# Patient Record
Sex: FEMALE | Race: BLACK OR AFRICAN AMERICAN | NOT HISPANIC OR LATINO | Employment: OTHER | URBAN - METROPOLITAN AREA
[De-identification: names, ages, dates, MRNs, and addresses within clinical notes are randomized per-mention and may not be internally consistent; named-entity substitution may affect disease eponyms.]

---

## 2021-03-15 ENCOUNTER — OFFICE VISIT - HEALTHEAST (OUTPATIENT)
Dept: FAMILY MEDICINE | Facility: CLINIC | Age: 31
End: 2021-03-15

## 2021-03-15 DIAGNOSIS — M22.2X2 PATELLOFEMORAL ARTHRALGIA OF BOTH KNEES: ICD-10-CM

## 2021-03-15 DIAGNOSIS — N92.6 MISSED PERIOD: ICD-10-CM

## 2021-03-15 DIAGNOSIS — D50.0 IRON DEFICIENCY ANEMIA DUE TO CHRONIC BLOOD LOSS: ICD-10-CM

## 2021-03-15 DIAGNOSIS — M22.2X1 PATELLOFEMORAL ARTHRALGIA OF BOTH KNEES: ICD-10-CM

## 2021-03-15 DIAGNOSIS — E66.3 OVERWEIGHT (BMI 25.0-29.9): ICD-10-CM

## 2021-03-15 DIAGNOSIS — Z00.00 ENCOUNTER FOR GENERAL ADULT MEDICAL EXAMINATION WITHOUT ABNORMAL FINDINGS: ICD-10-CM

## 2021-03-15 DIAGNOSIS — Z32.01 PREGNANCY TEST POSITIVE: ICD-10-CM

## 2021-03-15 LAB
CHOLEST SERPL-MCNC: 125 MG/DL
ERYTHROCYTE [DISTWIDTH] IN BLOOD BY AUTOMATED COUNT: 17.1 % (ref 11–14.5)
FASTING STATUS PATIENT QL REPORTED: NO
HBA1C MFR BLD: 5 %
HCG UR QL: POSITIVE
HCT VFR BLD AUTO: 33.6 % (ref 35–47)
HDLC SERPL-MCNC: 44 MG/DL
HGB BLD-MCNC: 10.1 G/DL (ref 12–16)
LDLC SERPL CALC-MCNC: 70 MG/DL
MCH RBC QN AUTO: 20.7 PG (ref 27–34)
MCHC RBC AUTO-ENTMCNC: 30.1 G/DL (ref 32–36)
MCV RBC AUTO: 69 FL (ref 80–100)
PLATELET # BLD AUTO: 424 THOU/UL (ref 140–440)
PMV BLD AUTO: 10.5 FL (ref 7–10)
RBC # BLD AUTO: 4.87 MILL/UL (ref 3.8–5.4)
TRIGL SERPL-MCNC: 55 MG/DL
TSH SERPL DL<=0.005 MIU/L-ACNC: 3.88 UIU/ML (ref 0.3–5)
WBC: 8.4 THOU/UL (ref 4–11)

## 2021-03-15 RX ORDER — PRENATAL VIT/IRON FUM/FOLIC AC 27MG-0.8MG
1 TABLET ORAL DAILY
Qty: 90 TABLET | Refills: 3 | Status: SHIPPED | OUTPATIENT
Start: 2021-03-15 | End: 2022-05-24

## 2021-03-15 ASSESSMENT — MIFFLIN-ST. JEOR: SCORE: 1407.72

## 2021-03-16 ENCOUNTER — COMMUNICATION - HEALTHEAST (OUTPATIENT)
Dept: FAMILY MEDICINE | Facility: CLINIC | Age: 31
End: 2021-03-16

## 2021-03-16 ENCOUNTER — AMBULATORY - HEALTHEAST (OUTPATIENT)
Dept: FAMILY MEDICINE | Facility: CLINIC | Age: 31
End: 2021-03-16

## 2021-03-16 DIAGNOSIS — D50.9 IRON DEFICIENCY ANEMIA, UNSPECIFIED IRON DEFICIENCY ANEMIA TYPE: ICD-10-CM

## 2021-03-16 LAB
FERRITIN SERPL-MCNC: 3 NG/ML (ref 10–130)
HCV AB SERPL QL IA: NEGATIVE

## 2021-03-16 RX ORDER — FERROUS SULFATE 325(65) MG
1 TABLET ORAL
Qty: 90 TABLET | Refills: 0 | Status: SHIPPED | OUTPATIENT
Start: 2021-03-16 | End: 2022-05-24

## 2021-03-17 ENCOUNTER — COMMUNICATION - HEALTHEAST (OUTPATIENT)
Dept: FAMILY MEDICINE | Facility: CLINIC | Age: 31
End: 2021-03-17

## 2021-03-19 ENCOUNTER — OFFICE VISIT - HEALTHEAST (OUTPATIENT)
Dept: PHYSICAL THERAPY | Facility: REHABILITATION | Age: 31
End: 2021-03-19

## 2021-03-19 DIAGNOSIS — M76.31 ILIOTIBIAL BAND SYNDROME OF RIGHT SIDE: ICD-10-CM

## 2021-03-19 DIAGNOSIS — M76.32 ILIOTIBIAL BAND SYNDROME OF LEFT SIDE: ICD-10-CM

## 2021-03-19 DIAGNOSIS — M22.2X1 PATELLOFEMORAL PAIN SYNDROME OF BOTH KNEES: ICD-10-CM

## 2021-03-19 DIAGNOSIS — M62.81 GENERALIZED MUSCLE WEAKNESS: ICD-10-CM

## 2021-03-19 DIAGNOSIS — M22.2X2 PATELLOFEMORAL PAIN SYNDROME OF BOTH KNEES: ICD-10-CM

## 2021-03-23 ENCOUNTER — OFFICE VISIT - HEALTHEAST (OUTPATIENT)
Dept: PHYSICAL THERAPY | Facility: REHABILITATION | Age: 31
End: 2021-03-23

## 2021-03-23 DIAGNOSIS — M22.2X2 PATELLOFEMORAL PAIN SYNDROME OF BOTH KNEES: ICD-10-CM

## 2021-03-23 DIAGNOSIS — M22.2X1 PATELLOFEMORAL PAIN SYNDROME OF BOTH KNEES: ICD-10-CM

## 2021-03-23 DIAGNOSIS — M76.31 ILIOTIBIAL BAND SYNDROME OF RIGHT SIDE: ICD-10-CM

## 2021-03-23 DIAGNOSIS — M62.81 GENERALIZED MUSCLE WEAKNESS: ICD-10-CM

## 2021-03-23 DIAGNOSIS — M76.32 ILIOTIBIAL BAND SYNDROME OF LEFT SIDE: ICD-10-CM

## 2021-04-13 ENCOUNTER — OFFICE VISIT - HEALTHEAST (OUTPATIENT)
Dept: PHYSICAL THERAPY | Facility: REHABILITATION | Age: 31
End: 2021-04-13

## 2021-04-13 DIAGNOSIS — M76.32 ILIOTIBIAL BAND SYNDROME OF LEFT SIDE: ICD-10-CM

## 2021-04-13 DIAGNOSIS — M62.81 GENERALIZED MUSCLE WEAKNESS: ICD-10-CM

## 2021-04-13 DIAGNOSIS — M22.2X1 PATELLOFEMORAL PAIN SYNDROME OF BOTH KNEES: ICD-10-CM

## 2021-04-13 DIAGNOSIS — M22.2X2 PATELLOFEMORAL PAIN SYNDROME OF BOTH KNEES: ICD-10-CM

## 2021-04-13 DIAGNOSIS — M76.31 ILIOTIBIAL BAND SYNDROME OF RIGHT SIDE: ICD-10-CM

## 2021-05-07 LAB
ABO (EXTERNAL): NORMAL
HEPATITIS B SURFACE ANTIGEN (EXTERNAL): NONREACTIVE
RH (EXTERNAL): POSITIVE
RUBELLA ANTIBODY IGG (EXTERNAL): NORMAL
TREPONEMA PALLIDUM ANTIBODY (EXTERNAL): NONREACTIVE

## 2021-06-05 VITALS
WEIGHT: 161.5 LBS | BODY MASS INDEX: 28.62 KG/M2 | HEIGHT: 63 IN | HEART RATE: 92 BPM | DIASTOLIC BLOOD PRESSURE: 60 MMHG | OXYGEN SATURATION: 100 % | SYSTOLIC BLOOD PRESSURE: 120 MMHG
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
## 2021-06-16 PROBLEM — Z32.01 PREGNANCY TEST POSITIVE: Status: ACTIVE | Noted: 2021-03-15

## 2021-06-16 PROBLEM — E66.3 OVERWEIGHT (BMI 25.0-29.9): Status: ACTIVE | Noted: 2021-03-16

## 2021-06-16 PROBLEM — D50.0 IRON DEFICIENCY ANEMIA DUE TO CHRONIC BLOOD LOSS: Status: ACTIVE | Noted: 2021-03-16

## 2021-06-16 NOTE — TELEPHONE ENCOUNTER
Using  left message for pt to call back. Please relay message from Dr. Ortega:     Overall, her blood work looks good but she is anemic and has low iron stores.  She should take daily prenatal vitamins and iron supplementation in addition to that.  I have sent over some iron supplementation to the pharmacy.  This is very important given that she is pregnant and her anemia will most likely get worse without supplementation during the pregnancy.     Please remind patient to call OB/GYN and set up the appointment for her initial prenatal visit.     Letter sent via mail.

## 2021-06-16 NOTE — PROGRESS NOTES
"[facility] Discharge Note    Patient Name: Tammie Odonnell  Date: 4/13/2021  Visit #: 3  PTA visit #:  1  Referral Diagnosis: Patellofemoral pain syndrome of both knees  Referring provider: Alyse Ortega*  Visit Diagnosis:     ICD-10-CM    1. Patellofemoral pain syndrome of both knees  M22.2X1     M22.2X2    2. Iliotibial band syndrome of left side  M76.32    3. Iliotibial band syndrome of right side  M76.31    4. Generalized muscle weakness  M62.81          Assessment:   Pt has no showed her previous 2 therapy sessions. She has not been icing or doing her HEP but reports doing better overall. Patient has pain-free knee AROM now. She is inquiring about imaging today. Due to her excellent ROM without pain, therapist does not see it medically necessary to request an order for imaging. Due to patient's consistent non-compliance with therapy, she is appropriate for discharge today. Encouraged patient to ice and perform her exercises as instructed/typed out for her, as these should help to continue to decrease her pain. Pt will need a new order to resume in the future.    HEP/POC compliance is  poor.  Patient demonstrates understanding/independence with home program.    Goal Status:  Pt. will demonstrate/verbalize independence in self-management of condition in : 12 weeks;Met  Pt. will be independent with home exercise program in : 6 weeks;Met    Pt will: have pain-free B knee AROM within 10 weeks in order to improve her QOL. GOAL MET  Pt will: no longer have pain getting out of a chair in order to return to PLOF. GOAL NOT MET      Plan / Patient Education:     Discharge from therapy    Subjective:     Pain Rating: \"i'm generally doing well\"    I'm doing good but I still feel pain. Pain is different and now on knee cap. In general, she's doing good. Wondering if she can get x-rays today.     Macedonian  used via phone today.    Objective:   B knee ROM: 0-135 degrees without pain.  Pt only doing " "3 repetitions of her HEP at a time    Exercises:  Exercise #1: SLR x10 B-pt only doing 3 reps on each side  Comment #1: clamshell x10 B-pt only doing 3 reps on each side  Exercise #2: hip add x10 with 5\" holds- pt only doing 4 reps  Exercise #3: supine hip flexor stretch  Comment #3: B  30\" x 2-pt not doing  Exercise #4: bridge-pt not doing  Comment #4: x5  Exercise #5: quadraped leg raises- pt not doing  Comment #5: x5 B    Treatment Today     TREATMENT MINUTES COMMENTS   Evaluation     Self-care/ Home management     Manual therapy     Neuromuscular Re-education     Therapeutic Activity     Therapeutic Exercises 23 Discussed progress. Answered pt questions. Reviewed HEP. Discussed goals and discharge plans.   Gait training     Modality__________________                Total 23    Blank areas are intentional and mean the treatment did not include these items.       Elkin Alas PT, DPT  4/13/2021    "

## 2021-06-16 NOTE — PROGRESS NOTES
[facility] Daily Progress     Patient Name: Tammie Odonnell  Date: 3/23/2021  Visit #: 2  PTA visit #:  1  Referral Diagnosis: Patellofemoral pain syndrome of both knees  Referring provider: Alyse Ortega*  Visit Diagnosis:     ICD-10-CM    1. Patellofemoral pain syndrome of both knees  M22.2X1     M22.2X2    2. Iliotibial band syndrome of left side  M76.32    3. Iliotibial band syndrome of right side  M76.31    4. Generalized muscle weakness  M62.81          Assessment:   Pt returns today for her first follow up appointment.  Pt with tenderness B lateral, superior and posterior knees.   Pt has not been doing her exercises thinking they would make her pain worse. Pt did have some pain with the SLRs.  Pt asking to see a doctor today. She was thinking she should see an orthopedic doctor. Pt was instructed to contact her PCP to see if see will need a referral.   Patient is benefitting from skilled physical therapy and is making steady progress toward functional goals.  Patient is appropriate to continue with skilled physical therapy intervention, as indicated by initial plan of care.    Goal Status:  Pt. will demonstrate/verbalize independence in self-management of condition in : 12 weeks  Pt. will be independent with home exercise program in : 6 weeks    Pt will: have pain-free B knee AROM within 10 weeks in order to improve her QOL.  Pt will: no longer have pain getting out of a chair in order to return to PLOF.      Plan / Patient Education:     Continue with initial plan of care.  Progress with home program as tolerated.   Pt instructed to perform her exercises every day for best results. Pt advised to try ice on her knees. Instructed her to ice for 10-15 minutes.    Subjective:   Pt reports she is still having knee pain.    Pt is not using ice or heat at home on her knees.  Pt reports she has not done the exercises. Pt states she did not do the exercises because she thought they would make the  "pain worse.  Pain Rating: 10        Objective:   B knee ROM: 0-135 degrees with ERP B    Exercises:  Exercise #1: SLR x10 B  Comment #1: clamshell x10 B  Exercise #2: hip add x10 with 5\" holds  Exercise #3: supine hip flexor stretch  Comment #3: B  30\" x 2  Exercise #4: bridge  Comment #4: x5  Exercise #5: quadraped leg raises  Comment #5: x5 B    Treatment Today     TREATMENT MINUTES COMMENTS   Evaluation     Self-care/ Home management     Manual therapy     Neuromuscular Re-education     Therapeutic Activity     Therapeutic Exercises 30 See flow sheet  Added to HEP:  -bridge  -quadraped leg raises  -supine hip flexor stretch   Gait training     Modality__________________                Total 30    Blank areas are intentional and mean the treatment did not include these items.       Shae Ramos,CLT  3/23/2021    "

## 2021-06-16 NOTE — TELEPHONE ENCOUNTER
Left message to call back for: Patient  Information to relay to patient:  MIHAI with [place] .  When Pt calls back please relay providers message below. Thank you      Please call patient and let her know:     Overall, her blood work looks good but she is anemic and has low iron stores.  She should take daily prenatal vitamins and iron supplementation in addition to that.  I have sent over some iron supplementation to the pharmacy.  This is very important given that she is pregnant and her anemia will most likely get worse without supplementation during the pregnancy.     Please remind patient to call OB/GYN and set up the appointment for her initial prenatal visit.

## 2021-06-16 NOTE — TELEPHONE ENCOUNTER
Patient was informed of the providers message and is in understanding.  Pt was also given a couple numbers for ob/gyn.

## 2021-06-16 NOTE — TELEPHONE ENCOUNTER
----- Message from Alyse Ortega DO sent at 3/16/2021 12:46 PM CDT -----  Please call patient and let her know:    Overall, her blood work looks good but she is anemic and has low iron stores.  She should take daily prenatal vitamins and iron supplementation in addition to that.  I have sent over some iron supplementation to the pharmacy.  This is very important given that she is pregnant and her anemia will most likely get worse without supplementation during the pregnancy.    Please remind patient to call OB/GYN and set up the appointment for her initial prenatal visit.

## 2021-06-18 NOTE — PATIENT INSTRUCTIONS - HE
Patient Instructions by Alyse Ortega DO at 3/15/2021  2:40 PM     Author: Alyse Ortega DO Service: -- Author Type: Physician    Filed: 3/15/2021  3:41 PM Encounter Date: 3/15/2021 Status: Addendum    : Alyse Ortega DO (Physician)    Related Notes: Original Note by Alyse Ortega DO (Physician) filed at 3/15/2021  3:40 PM       Hi your pregnancy test came back positive, congratulations!    Your approximately 4 weeks pregnant.  You need to follow-up with OB/GYN at around 8 weeks.  Please see [facility] on call at [phone] to establish care.    Please make sure that you are taking prenatal vitamins daily.    Due to your positive pregnancy test, we will defer all your vaccines for today.    For your knee pain, I have referred you to physical therapy.  Someone should be giving you a call to help schedule that appointment.      Patient Education   Understanding [employer] MyPlate  The USDA ([place] Department of Agriculture) has guidelines to help you make healthy food choices. These are called MyPlate. MyPlate shows the food groups that make up healthy meals using the image of a place setting. Before you eat, think about the healthiest choices for what to put onto your plate or into your cup or bowl. To learn more about building a healthy plate, visit www.choosemyplate.gov.       The Food Groups    Fruits: Any fruit or 100% fruit juice counts as part of the Fruit Group. Fruits may be fresh, canned, frozen, or dried, and may be whole, cut-up, or pureed. Make half your plate fruits and vegetables.    Vegetables: Any vegetable or 100% vegetable juice counts as a member of the Vegetable Group. Vegetables may be fresh, frozen, canned, or dried. They can be served raw or cooked and may be whole, cut-up, or mashed. Make half your plate fruits and vegetables.     Grains: All foods made from grains are part of the Grains Group. These include wheat, rice, oats, cornmeal, and  barley such as bread, pasta, oatmeal, cereal, tortillas, and grits. Grains should be no more than a quarter of your plate. At least half of your grains should be whole grains.    Protein: This group includes meat, poultry, seafood, beans and peas, eggs, processed soy products (like tofu), nuts (including nut butters), and seeds. Make protein choices no more than a quarter of your plate. Meat and poultry choices should be lean or low fat.    Dairy: All fluid milk products and foods made from milk that contain calcium, like yogurt and cheese are part of the Dairy Group. (Foods that have little calcium, such as cream, butter, and cream cheese, are not part of the group.) Most dairy choices should be low-fat or fat-free.    Oils: These are fats that are liquid at room temperature. They include canola, corn, olive, soybean, and sunflower oil. Foods that are mainly oil include mayonnaise, certain salad dressings, and soft margarines. You should have only 5 to 7 teaspoons of oils a day. You probably already get this much from the food you eat.  Use [employer] to Help Build Your Meals  The [employer] can help you plan and track your meals and activity. You can look up individual foods to see or compare their nutritional value. You can get guidelines for what and how much you should eat. You can compare your food choices. And you can assess personal physical activities and see ways you can improve. Go to www.[employer].gov/supertracker/.    0721-5503 [employer]. [address] [place] [address], [place] [address]. All rights reserved. This information is not intended as a substitute for professional medical care. Always follow your healthcare professional's instructions.

## 2021-06-18 NOTE — PATIENT INSTRUCTIONS - HE
Patient Instructions by Shae Purvis PTA at 3/23/2021  1:30 PM     Author: Shae Purvis PTA Service: -- Author Type: Physical Therapist Assistant    Filed: 3/23/2021  1:55 PM Encounter Date: 3/23/2021 Status: Signed    : Shae Purvis PTA (Physical Therapist Assistant)        QUADRUPED ARM LIFT    Start on hands and knees while keeping your spine flat and neutral.  Tighten abdominal muscles.  Raise arm and hold 3-5 seconds.  Return to original position and alternate arms.    QUADRUPED LEG LIFT    Start on hands and knees while keeping your spine flat and neutral.  Tighten abdominal muscles.  Raise leg back and hold 3-5 seconds.  Return to original position and alternate legs.    QUADRUPED ARM/LEG LIFT    Start on hands and knees while keeping your spine flat and neutral.  Tighten abdominal muscles.  Raise leg back and opposite arm and hold 3-5 seconds.  Return to original position and alternate.

## 2021-06-18 NOTE — PATIENT INSTRUCTIONS - HE
Patient Instructions by Elkin Alas PT at 3/19/2021  8:00 AM     Author: Elkin Alas PT Service: -- Author Type: Physical Therapist    Filed: 3/19/2021  8:54 AM Encounter Date: 3/19/2021 Status: Signed    : Elkin Alas PT (Physical Therapist)       It is recommended that you ice 2-3x/day for 20 minutes at a time. Remember to not apply ice directly on skin.    Remember to wear good footwear!     STRAIGHT LEG RAISE - SLR    While lying or sitting, raise up your leg with a straight knee.  Keep the opposite knee bent with the foot planted to the ground. Do 10-20 reps on each side. Perform 1-2x/day      CLAM SHELLS    While lying on your side with your knees bent, draw up the top knee while keeping contact of your feet together.    Do not let your pelvis roll back during the lifting movement. Hold for 5 seconds on each side. Do 10-15 repetitions at a time. Perform 1-2x/day      BALL SQUEEZE - SEATED    While sitting, place a ball between your knees. Squeeze the ball with your knees and hold for 5 seconds. Do 10-20 repetitions at a time. Perform 1-2x/day

## 2021-06-30 NOTE — PROGRESS NOTES
Progress Notes by Elkin Alas PT at 3/19/2021  8:00 AM     Author: Elkin Alas PT Service: -- Author Type: Physical Therapist    Filed: 3/19/2021 12:19 PM Encounter Date: 3/19/2021 Status: Attested    : Elkin Alas PT (Physical Therapist) Cosigner: Alyse Ortega DO at 3/19/2021  5:23 PM    Attestation signed by Alyse Ortega DO at 3/19/2021  5:23 PM    Note reviewed, agree with plan.                     [facility] Certification Request    March 19, 2021      Patient: Tammie Odonnell  MR Number: [id number]  YOB: 1990  Date of Visit: 3/19/2021      Dear Dr. Ortega,    Thank you for this referral.   We are seeing Tammie Odonnell for Physical Therapy of bilateral knee pain.    Medicare and/or Medicaid requires physician review and approval of the treatment plan. Please review the plan of care and verify that you agree with the therapy plan of care by co-signing this note.      Plan of Care  Authorization / Certification Start Date: 03/19/21  Authorization / Certification End Date: 06/18/21  Authorization / Certification Number of Visits: 12  Communication with: Referral Source  Patient Related Instruction: Nature of Condition;Precautions;Next steps;Treatment plan and rationale;Expected outcome;Self Care instruction;Basis of treatment;Body mechanics;Posture  Times per Week: 1  Number of Weeks: 12  Number of Visits: 12  Discharge Planning: when PT goals are met  Precautions / Restrictions : PT IS 2 MONTHS PREGNANT  Therapeutic Exercise: ROM;Stretching;Strengthening  Neuromuscular Reeducation: core;kinesio tape;balance/proprioception  Manual Therapy: soft tissue mobilization;myofascial release;joint mobilization;muscle energy  Equipment: theraband      Goals:  Pt. will demonstrate/verbalize independence in self-management of condition in : 12 weeks  Pt. will be independent with home exercise program in : 6 weeks    Pt will: have pain-free B knee AROM within 10  weeks in order to improve her QOL.  Pt will: no longer have pain getting out of a chair in order to return to OF.        If you have any questions or concerns, please don't hesitate to call.    Sincerely,      Elkin Alas, PT, DPT        Physician recommendation:     ___ Follow therapist's recommendation        ___ Modify therapy      *Physician co-signature indicates they certify the need for these services furnished within this plan and while under their care.      [facility]  Knee Initial Evaluation    Patient Name: Tammie Odonnell  Date of evaluation: 3/19/2021  Referral Diagnosis: patellofemoral arthralgia of both knees  Referring provider: Alyse Ortega*  Visit Diagnosis:     ICD-10-CM    1. Patellofemoral pain syndrome of both knees  M22.2X1     M22.2X2    2. Iliotibial band syndrome of left side  M76.32    3. Iliotibial band syndrome of right side  M76.31    4. Generalized muscle weakness  M62.81        History reviewed. No pertinent past medical history.    Assessment:        Tammie Odonnell is a 31 y.o. female who presents to therapy today with chief complaints of chronic B knee pain.  Symptoms began 1 year ago without known injury.  Difficulty with standing up after sitting due to pain.  Pain symptoms are not improving.  Patient demonstrates signs and sx consistent with bilateral patellofemoral pain and bilateral IT band syndrome. PT POC and goals have been discussed with patient and She  is agreeable to these. Patient appears motivated for physical therapy and is appropriate for skilled therapy services.    The POC is dynamic and will be modified on an ongoing basis.  Barriers to achieving goals as noted in the assessment section may affect outcome.  Prognosis to achieve goals is  good   Pt. is appropriate for skilled PT intervention as outlined in the Plan of Care (POC).  Pt. is a good candidate for skilled PT services to improve pain levels and function.    Goals:  Pt. will  "demonstrate/verbalize independence in self-management of condition in : 12 weeks  Pt. will be independent with home exercise program in : 6 weeks    Pt will: have pain-free B knee AROM within 10 weeks in order to improve her QOL.  Pt will: no longer have pain getting out of a chair in order to return to PLOF.      Goals and plan of care were set in collaboration with the patient.    Patient's expectations/goals are realistic.    Barriers to Learning or Achieving Goals:  Chronicity of the problem.  Language barrier     Plan / Patient Instructions:      Plan of Care:   Authorization / Certification Start Date: 03/19/21  Authorization / Certification End Date: 06/18/21  Authorization / Certification Number of Visits: 12  Communication with: Referral Source  Patient Related Instruction: Nature of Condition;Precautions;Next steps;Treatment plan and rationale;Expected outcome;Self Care instruction;Basis of treatment;Body mechanics;Posture  Times per Week: 1  Number of Weeks: 12  Number of Visits: 12  Discharge Planning: when PT goals are met  Precautions / Restrictions : PT IS 2 MONTHS PREGNANT  Therapeutic Exercise: ROM;Stretching;Strengthening  Neuromuscular Reeducation: core;kinesio tape;balance/proprioception  Manual Therapy: soft tissue mobilization;myofascial release;joint mobilization;muscle energy  Equipment: [email or web address]      POC and pathology of condition were reviewed with patient.  Pt. is in agreement with the Plan of Care  A Home Exercise Program (HEP) was initiated today.  Pt. was instructed in exercises by PT and patient was given a handout with detailed instructions.    Exercises:  Exercise #1: SLR x10 B  Comment #1: clamshell x10 B  Exercise #2: hip add x10 with 5\" holds      Treatment techniques, plan of care, and goals were discussed with the patient. The patient agrees to the plan as outlined. The plan of care is dynamic and will be modified on an ongoing basis.    Plan for next visit: NO PRONE EXS- Pt is " 2 months pregnant. Review HEP. hip flexor stretches, bridge, bird/dog (glut/core strengthening)     Subjective:        Social information:   Occupation: unemployed   2 kids at home    present during today's session     History of Present Illness:    Tammie is a 31 y.o. female who presents to therapy today with complaints of bilateral knee pain. Date of onset/duration of symptoms is 1 year. Onset was sudden without known injury. Symptoms are not improving. Hasn't used ice or heat.     is interpreting due to difficulty getting attention of  via phone    Pain Ratin  Pain description: pain on posterior/lateral knee.     Functional limitations are described as occurring with: standing up, sitting         Objective:      Note: Items left blank indicates the item was not performed or not indicated at the time of the evaluation.    Knee Examination  1. Patellofemoral pain syndrome of both knees     2. Iliotibial band syndrome of left side     3. Iliotibial band syndrome of right side     4. Generalized muscle weakness       Precautions/Restrictions:  pt is 2 months pregnant  Involved Side: Bilateral  Assistive Device: None  Gait Observation: normal  Lumbar Clearing: Does not provoke symptoms  Hip Clearing: Does not provoke symptoms    Knee ROM         AROM in degrees  Right   Left       Knee Flexion  (130 )   128- pain at end- range   125, pain at end-range       Knee Extension  (0 )   0   -     PROM in degrees  Right   Left       Knee Flexion  (130 )             Knee Extension  (0 )           LE Strength                Strength (MMT/5)  Right   Left       Hip Flexion   4   4       Hip Abduction   4-   4-       Hip Adduction   4-   4-       Hip Extension             Hip Internal Rotation   4-   4-       Hip External Rotation 4-   4-       Knee Extension   4-   4-       Knee Flexion   4   4       Ankle Dorsiflexion             Ankle Plantarflexion             Palpation:  Tender B patellar tendons, B  distal IT bands  Some mild edema in B ant knees    Knee Special Tests (+/-):      Knee OA Cluster   Right   Left   Ligament Tests   Right   Left    1. > 49 y/o           Lachman   -   -    2. Stiffness > 30 min.           Anterior Drawer          3. Crepitus           Posterior Drawer   -   -    4. Bony tenderness           Posterior Sag -   -    5. Bone enlargement           Valgus Stress   -   -    6. No warmth to the touch           Varus Stress   -   -     Meniscal Tests   Right   Left    Other   Right    Left       Darius's           Ely's             Joint line tenderness           Ja             Thessaly           roy's   +   +       Apley's           compression   +   +         Treatment Today     TREATMENT MINUTES COMMENTS   Evaluation 20 Knee eval. Discussed PT POC and pathology of condition.   Self-care/ Home management 15 Answered patient questions regarding management of condition. Recommend patient ice daily and wear good footwear.   Manual therapy     Neuromuscular Re-education     Therapeutic Activity     Therapeutic Exercises 10 Began HEP-see flowsheet.    Gait training     Modality__________________                Total 45    Blank areas are intentional and mean the treatment did not include these items.     PT Evaluation Code: (Please list factors)  Patient History/Comorbidities: see PMH  Examination: B knee pain  Clinical Presentation: stable  Clinical Decision Making: low    Patient History/  Comorbidities Examination  (body structures and functions, activity limitations, and/or participation restrictions) Clinical Presentation Clinical Decision Making (Complexity)   No documented Comorbidities or personal factors 1-2 Elements Stable and/or uncomplicated Low   1-2 documented comorbidities or personal factor 3 Elements Evolving clinical presentation with changing characteristics Moderate   3-4 documented comorbidities or personal factors 4 or more Unstable and unpredictable High                 Elkin Alas, PT, DPT  3/19/2021  8:03 AM

## 2021-07-04 NOTE — ADDENDUM NOTE
Addendum Note by Romulo Ortega DO at 3/15/2021  2:40 PM     Author: Romulo Ortega DO Service: -- Author Type: Physician    Filed: 3/16/2021  6:47 AM Encounter Date: 3/15/2021 Status: Signed    : Romulo Ortega DO (Physician)    Addended by: ROMULO ORTEGA on: 3/16/2021 06:47 AM        Modules accepted: Orders

## 2021-07-24 ENCOUNTER — HOSPITAL ENCOUNTER (OUTPATIENT)
Facility: HOSPITAL | Age: 31
End: 2021-07-24
Admitting: OBSTETRICS & GYNECOLOGY

## 2021-07-24 ENCOUNTER — HOSPITAL ENCOUNTER (EMERGENCY)
Facility: HOSPITAL | Age: 31
Discharge: ANOTHER HOSPITAL | End: 2021-07-24

## 2021-07-24 ENCOUNTER — HOSPITAL ENCOUNTER (OUTPATIENT)
Facility: HOSPITAL | Age: 31
Discharge: HOME | End: 2021-07-24
Attending: OBSTETRICS & GYNECOLOGY | Admitting: OBSTETRICS & GYNECOLOGY
Payer: COMMERCIAL

## 2021-07-24 VITALS
WEIGHT: 130 LBS | OXYGEN SATURATION: 99 % | BODY MASS INDEX: 23.21 KG/M2 | RESPIRATION RATE: 18 BRPM | DIASTOLIC BLOOD PRESSURE: 56 MMHG | HEART RATE: 84 BPM | TEMPERATURE: 97.9 F | SYSTOLIC BLOOD PRESSURE: 97 MMHG

## 2021-07-24 VITALS — RESPIRATION RATE: 16 BRPM | TEMPERATURE: 98.7 F | SYSTOLIC BLOOD PRESSURE: 122 MMHG | DIASTOLIC BLOOD PRESSURE: 70 MMHG

## 2021-07-24 LAB
ALBUMIN SERPL-MCNC: 2.7 G/DL (ref 3.5–5)
ALBUMIN UR-MCNC: NEGATIVE MG/DL
ALP SERPL-CCNC: 69 U/L (ref 45–120)
ALT SERPL W P-5'-P-CCNC: <9 U/L (ref 0–45)
ANION GAP SERPL CALCULATED.3IONS-SCNC: 9 MMOL/L (ref 5–18)
APPEARANCE UR: CLEAR
AST SERPL W P-5'-P-CCNC: 12 U/L (ref 0–40)
BASOPHILS # BLD AUTO: 0 10E3/UL (ref 0–0.2)
BASOPHILS NFR BLD AUTO: 0 %
BILIRUB SERPL-MCNC: 0.3 MG/DL (ref 0–1)
BILIRUB UR QL STRIP: NEGATIVE
BUN SERPL-MCNC: 6 MG/DL (ref 8–22)
CALCIUM SERPL-MCNC: 8.3 MG/DL (ref 8.5–10.5)
CHLORIDE BLD-SCNC: 109 MMOL/L (ref 98–107)
CO2 SERPL-SCNC: 20 MMOL/L (ref 22–31)
COLOR UR AUTO: NORMAL
CREAT SERPL-MCNC: 0.64 MG/DL (ref 0.6–1.1)
EOSINOPHIL # BLD AUTO: 0.2 10E3/UL (ref 0–0.7)
EOSINOPHIL NFR BLD AUTO: 3 %
ERYTHROCYTE [DISTWIDTH] IN BLOOD BY AUTOMATED COUNT: 14.5 % (ref 10–15)
GFR SERPL CREATININE-BSD FRML MDRD: >90 ML/MIN/1.73M2
GLUCOSE BLD-MCNC: 132 MG/DL (ref 70–125)
GLUCOSE UR STRIP-MCNC: NEGATIVE MG/DL
HCT VFR BLD AUTO: 33.8 % (ref 35–47)
HGB BLD-MCNC: 10.4 G/DL (ref 11.7–15.7)
HGB UR QL STRIP: NEGATIVE
HOLD SPECIMEN: NORMAL
IMM GRANULOCYTES # BLD: 0 10E3/UL
IMM GRANULOCYTES NFR BLD: 0 %
KETONES UR STRIP-MCNC: NEGATIVE MG/DL
LEUKOCYTE ESTERASE UR QL STRIP: NEGATIVE
LYMPHOCYTES # BLD AUTO: 3.3 10E3/UL (ref 0.8–5.3)
LYMPHOCYTES NFR BLD AUTO: 37 %
MCH RBC QN AUTO: 25.2 PG (ref 26.5–33)
MCHC RBC AUTO-ENTMCNC: 30.8 G/DL (ref 31.5–36.5)
MCV RBC AUTO: 82 FL (ref 78–100)
MONOCYTES # BLD AUTO: 0.4 10E3/UL (ref 0–1.3)
MONOCYTES NFR BLD AUTO: 5 %
NEUTROPHILS # BLD AUTO: 5 10E3/UL (ref 1.6–8.3)
NEUTROPHILS NFR BLD AUTO: 55 %
NITRATE UR QL: NEGATIVE
NRBC # BLD AUTO: 0 10E3/UL
NRBC BLD AUTO-RTO: 0 /100
PH UR STRIP: 6.5 [PH] (ref 5–7)
PLATELET # BLD AUTO: 164 10E3/UL (ref 150–450)
POTASSIUM BLD-SCNC: 3.2 MMOL/L (ref 3.5–5)
PROT SERPL-MCNC: 6.2 G/DL (ref 6–8)
RBC # BLD AUTO: 4.12 10E6/UL (ref 3.8–5.2)
SODIUM SERPL-SCNC: 138 MMOL/L (ref 136–145)
SP GR UR STRIP: 1.02 (ref 1–1.03)
UROBILINOGEN UR STRIP-MCNC: <2 MG/DL
WBC # BLD AUTO: 9 10E3/UL (ref 4–11)

## 2021-07-24 PROCEDURE — G0463 HOSPITAL OUTPT CLINIC VISIT: HCPCS

## 2021-07-24 PROCEDURE — 85025 COMPLETE CBC W/AUTO DIFF WBC: CPT | Performed by: OBSTETRICS & GYNECOLOGY

## 2021-07-24 PROCEDURE — 250N000011 HC RX IP 250 OP 636: Performed by: OBSTETRICS & GYNECOLOGY

## 2021-07-24 PROCEDURE — 250N000013 HC RX MED GY IP 250 OP 250 PS 637: Performed by: OBSTETRICS & GYNECOLOGY

## 2021-07-24 PROCEDURE — 80053 COMPREHEN METABOLIC PANEL: CPT | Performed by: OBSTETRICS & GYNECOLOGY

## 2021-07-24 PROCEDURE — 81003 URINALYSIS AUTO W/O SCOPE: CPT | Performed by: OBSTETRICS & GYNECOLOGY

## 2021-07-24 PROCEDURE — 36415 COLL VENOUS BLD VENIPUNCTURE: CPT | Performed by: OBSTETRICS & GYNECOLOGY

## 2021-07-24 RX ORDER — ACETAMINOPHEN 325 MG/1
650 TABLET ORAL EVERY 4 HOURS PRN
Status: CANCELLED | OUTPATIENT
Start: 2021-07-24

## 2021-07-24 RX ORDER — ACETAMINOPHEN 325 MG/1
650 TABLET ORAL ONCE
Status: COMPLETED | OUTPATIENT
Start: 2021-07-24 | End: 2021-07-24

## 2021-07-24 RX ORDER — DEXTROSE, SODIUM CHLORIDE, SODIUM LACTATE, POTASSIUM CHLORIDE, AND CALCIUM CHLORIDE 5; .6; .31; .03; .02 G/100ML; G/100ML; G/100ML; G/100ML; G/100ML
INJECTION, SOLUTION INTRAVENOUS CONTINUOUS
Status: DISCONTINUED | OUTPATIENT
Start: 2021-07-24 | End: 2021-07-24 | Stop reason: HOSPADM

## 2021-07-24 RX ADMIN — ACETAMINOPHEN 650 MG: 325 TABLET ORAL at 02:59

## 2021-07-24 RX ADMIN — SODIUM CHLORIDE, SODIUM LACTATE, POTASSIUM CHLORIDE, CALCIUM CHLORIDE AND DEXTROSE MONOHYDRATE 125 ML: 5; 600; 310; 30; 20 INJECTION, SOLUTION INTRAVENOUS at 02:30

## 2021-07-24 NOTE — PROGRESS NOTES
Dr Olson into the patient room. Lab results discussed. Verbal order to discharge patient home received.

## 2021-07-24 NOTE — DISCHARGE INSTRUCTIONS
Self Monitoring  Once a day, usually one hour after dinner, lie down on your side and count the next 5 baby kicks, rolls or movements. Do not count hiccoughs, if you reach 5 before one hour, you are done for the day. If it takes more than one hour to feel 5 movements, call your doctor right away.    Notify your Doctor / Call hospital [phone]    If you experience decreased baby movements, a change in the normal pattern of baby movements or a sudden increase in wild movements followed by the absence of movements.    If it is your first baby and uterine contractions are less than 5 minutes apart for at least one hour.    The contractions are lasting about one minute and you cannot talk or walk when you have a contraction.    If this is your second baby (or more) call your doctor when your contractions are steadily increasing in intensity and less than 10 minutes apart.    Vaginal bleeding or increase in vaginal discharge.    Suspected rupture of membranes, note time, amount and color of fluid.    Other ***          It was my pleasure taking care of you. Please call with any questions or concerns at any time. All the best!

## 2021-07-24 NOTE — PROGRESS NOTES
0209, Pt informed of the care plan. Pt via an  states she is not in labor and does not want her cervix checked. She wants iv fluids to help for her weakness and medication for her pain.  0210, Dr Clive Olson notified .  0211, Dr Clive Olson at patient bedside  evaluating her. Orders placed including fluid hydration with D5%LR.

## 2021-07-24 NOTE — ED NOTES
After triage started, pt stated that she was six months pregnant. Pt was complaining of upper abdominal pain so an L&D charge nurse was called. Pt also started complaining of cramping. Pt was brought upstairs to L&D.

## 2021-07-24 NOTE — PROGRESS NOTES
Discharge home instructions given verbally and written via an . Pt verbalized understanding.  Pt and  escorted to ED.

## 2021-07-24 NOTE — PROGRESS NOTES
Pt presented into the unit for evaluation of headache, blowded abdomen and pain in her previous c/section incision area. Onset yesterday at 0800.   Pt placed on fetal monitor and oriented to use of call light.  0208, Dr.Anderson called and updated on the above patient complaints. Order to check cervix and if not dilated to send patient to ED received.

## 2021-07-24 NOTE — PROGRESS NOTES
Pt states her headache is gone and lower abdomen pain much improved. Iv fluid flush done.  Dr Olson updated with the above.

## 2021-10-07 ENCOUNTER — TRANSFERRED RECORDS (OUTPATIENT)
Dept: HEALTH INFORMATION MANAGEMENT | Facility: CLINIC | Age: 31
End: 2021-10-07

## 2021-10-11 ENCOUNTER — TRANSFERRED RECORDS (OUTPATIENT)
Dept: HEALTH INFORMATION MANAGEMENT | Facility: CLINIC | Age: 31
End: 2021-10-11

## 2021-10-15 LAB — GROUP B STREPTOCOCCUS (EXTERNAL): NEGATIVE

## 2021-11-09 DIAGNOSIS — Z11.59 ENCOUNTER FOR SCREENING FOR OTHER VIRAL DISEASES: ICD-10-CM

## 2021-11-11 ENCOUNTER — ANESTHESIA (OUTPATIENT)
Dept: OBGYN | Facility: HOSPITAL | Age: 31
End: 2021-11-11
Payer: COMMERCIAL

## 2021-11-11 ENCOUNTER — HOSPITAL ENCOUNTER (INPATIENT)
Facility: HOSPITAL | Age: 31
LOS: 3 days | Discharge: HOME | End: 2021-11-14
Attending: OBSTETRICS & GYNECOLOGY | Admitting: OBSTETRICS & GYNECOLOGY
Payer: COMMERCIAL

## 2021-11-11 ENCOUNTER — ANESTHESIA EVENT (OUTPATIENT)
Dept: OBGYN | Facility: HOSPITAL | Age: 31
End: 2021-11-11
Payer: COMMERCIAL

## 2021-11-11 DIAGNOSIS — G89.18 POSTOPERATIVE PAIN: Primary | ICD-10-CM

## 2021-11-11 PROBLEM — Z34.90 PREGNANCY: Status: ACTIVE | Noted: 2021-11-11

## 2021-11-11 LAB
ABO/RH(D): NORMAL
ANTIBODY SCREEN: NEGATIVE
HGB BLD-MCNC: 10.3 G/DL (ref 11.7–15.7)
SARS-COV-2 RNA RESP QL NAA+PROBE: NEGATIVE
SPECIMEN EXPIRATION DATE: NORMAL
T PALLIDUM AB SER QL: NONREACTIVE

## 2021-11-11 PROCEDURE — 250N000009 HC RX 250: Performed by: OBSTETRICS & GYNECOLOGY

## 2021-11-11 PROCEDURE — 250N000011 HC RX IP 250 OP 636: Performed by: ANESTHESIOLOGY

## 2021-11-11 PROCEDURE — 250N000011 HC RX IP 250 OP 636: Performed by: OBSTETRICS & GYNECOLOGY

## 2021-11-11 PROCEDURE — 36415 COLL VENOUS BLD VENIPUNCTURE: CPT | Performed by: OBSTETRICS & GYNECOLOGY

## 2021-11-11 PROCEDURE — 999N000249 HC STATISTIC C-SECTION ON UNIT: Performed by: OBSTETRICS & GYNECOLOGY

## 2021-11-11 PROCEDURE — 258N000003 HC RX IP 258 OP 636: Performed by: OBSTETRICS & GYNECOLOGY

## 2021-11-11 PROCEDURE — 272N000001 HC OR GENERAL SUPPLY STERILE: Performed by: OBSTETRICS & GYNECOLOGY

## 2021-11-11 PROCEDURE — 120N000001 HC R&B MED SURG/OB

## 2021-11-11 PROCEDURE — 250N000013 HC RX MED GY IP 250 OP 250 PS 637: Performed by: OBSTETRICS & GYNECOLOGY

## 2021-11-11 PROCEDURE — 999N000249 HC STATISTIC C-SECTION ON UNIT

## 2021-11-11 PROCEDURE — 258N000003 HC RX IP 258 OP 636: Performed by: ANESTHESIOLOGY

## 2021-11-11 PROCEDURE — 85018 HEMOGLOBIN: CPT | Performed by: OBSTETRICS & GYNECOLOGY

## 2021-11-11 PROCEDURE — 86780 TREPONEMA PALLIDUM: CPT | Performed by: OBSTETRICS & GYNECOLOGY

## 2021-11-11 PROCEDURE — 258N000003 HC RX IP 258 OP 636: Performed by: NURSE ANESTHETIST, CERTIFIED REGISTERED

## 2021-11-11 PROCEDURE — C9290 INJ, BUPIVACAINE LIPOSOME: HCPCS | Performed by: ANESTHESIOLOGY

## 2021-11-11 PROCEDURE — 250N000009 HC RX 250: Performed by: ANESTHESIOLOGY

## 2021-11-11 PROCEDURE — 250N000009 HC RX 250: Performed by: NURSE ANESTHETIST, CERTIFIED REGISTERED

## 2021-11-11 PROCEDURE — 370N000017 HC ANESTHESIA TECHNICAL FEE, PER MIN: Performed by: OBSTETRICS & GYNECOLOGY

## 2021-11-11 PROCEDURE — 360N000076 HC SURGERY LEVEL 3, PER MIN: Performed by: OBSTETRICS & GYNECOLOGY

## 2021-11-11 PROCEDURE — 86900 BLOOD TYPING SEROLOGIC ABO: CPT | Performed by: OBSTETRICS & GYNECOLOGY

## 2021-11-11 PROCEDURE — 87635 SARS-COV-2 COVID-19 AMP PRB: CPT | Performed by: OBSTETRICS & GYNECOLOGY

## 2021-11-11 PROCEDURE — 250N000011 HC RX IP 250 OP 636: Performed by: NURSE ANESTHETIST, CERTIFIED REGISTERED

## 2021-11-11 RX ORDER — NALBUPHINE HYDROCHLORIDE 10 MG/ML
2.5-5 INJECTION, SOLUTION INTRAMUSCULAR; INTRAVENOUS; SUBCUTANEOUS EVERY 6 HOURS PRN
Status: DISCONTINUED | OUTPATIENT
Start: 2021-11-11 | End: 2021-11-14 | Stop reason: HOSPADM

## 2021-11-11 RX ORDER — CARBOPROST TROMETHAMINE 250 UG/ML
250 INJECTION, SOLUTION INTRAMUSCULAR
Status: DISCONTINUED | OUTPATIENT
Start: 2021-11-11 | End: 2021-11-11 | Stop reason: HOSPADM

## 2021-11-11 RX ORDER — HYDROMORPHONE HYDROCHLORIDE 1 MG/ML
0.2 INJECTION, SOLUTION INTRAMUSCULAR; INTRAVENOUS; SUBCUTANEOUS EVERY 5 MIN PRN
Status: DISCONTINUED | OUTPATIENT
Start: 2021-11-11 | End: 2021-11-11

## 2021-11-11 RX ORDER — ONDANSETRON 4 MG/1
4 TABLET, ORALLY DISINTEGRATING ORAL EVERY 30 MIN PRN
Status: DISCONTINUED | OUTPATIENT
Start: 2021-11-11 | End: 2021-11-11

## 2021-11-11 RX ORDER — OXYTOCIN 10 [USP'U]/ML
10 INJECTION, SOLUTION INTRAMUSCULAR; INTRAVENOUS
Status: DISCONTINUED | OUTPATIENT
Start: 2021-11-11 | End: 2021-11-14 | Stop reason: HOSPADM

## 2021-11-11 RX ORDER — NALOXONE HYDROCHLORIDE 0.4 MG/ML
0.2 INJECTION, SOLUTION INTRAMUSCULAR; INTRAVENOUS; SUBCUTANEOUS
Status: DISCONTINUED | OUTPATIENT
Start: 2021-11-11 | End: 2021-11-14 | Stop reason: HOSPADM

## 2021-11-11 RX ORDER — OXYTOCIN/0.9 % SODIUM CHLORIDE 30/500 ML
PLASTIC BAG, INJECTION (ML) INTRAVENOUS CONTINUOUS PRN
Status: DISCONTINUED | OUTPATIENT
Start: 2021-11-11 | End: 2021-11-11

## 2021-11-11 RX ORDER — MODIFIED LANOLIN
OINTMENT (GRAM) TOPICAL
Status: DISCONTINUED | OUTPATIENT
Start: 2021-11-11 | End: 2021-11-14 | Stop reason: HOSPADM

## 2021-11-11 RX ORDER — AMOXICILLIN 250 MG
1 CAPSULE ORAL 2 TIMES DAILY
Status: DISCONTINUED | OUTPATIENT
Start: 2021-11-11 | End: 2021-11-14 | Stop reason: HOSPADM

## 2021-11-11 RX ORDER — KETOROLAC TROMETHAMINE 30 MG/ML
INJECTION, SOLUTION INTRAMUSCULAR; INTRAVENOUS PRN
Status: DISCONTINUED | OUTPATIENT
Start: 2021-11-11 | End: 2021-11-11

## 2021-11-11 RX ORDER — OXYTOCIN/0.9 % SODIUM CHLORIDE 30/500 ML
340 PLASTIC BAG, INJECTION (ML) INTRAVENOUS CONTINUOUS PRN
Status: DISCONTINUED | OUTPATIENT
Start: 2021-11-11 | End: 2021-11-11 | Stop reason: HOSPADM

## 2021-11-11 RX ORDER — MORPHINE SULFATE 1 MG/ML
INJECTION, SOLUTION EPIDURAL; INTRATHECAL; INTRAVENOUS
Status: COMPLETED | OUTPATIENT
Start: 2021-11-11 | End: 2021-11-11

## 2021-11-11 RX ORDER — OXYTOCIN/0.9 % SODIUM CHLORIDE 30/500 ML
340 PLASTIC BAG, INJECTION (ML) INTRAVENOUS CONTINUOUS PRN
Status: DISCONTINUED | OUTPATIENT
Start: 2021-11-11 | End: 2021-11-14 | Stop reason: HOSPADM

## 2021-11-11 RX ORDER — ONDANSETRON 2 MG/ML
INJECTION INTRAMUSCULAR; INTRAVENOUS PRN
Status: DISCONTINUED | OUTPATIENT
Start: 2021-11-11 | End: 2021-11-11

## 2021-11-11 RX ORDER — KETOROLAC TROMETHAMINE 30 MG/ML
30 INJECTION, SOLUTION INTRAMUSCULAR; INTRAVENOUS EVERY 6 HOURS PRN
Status: DISPENSED | OUTPATIENT
Start: 2021-11-11 | End: 2021-11-12

## 2021-11-11 RX ORDER — OXYCODONE HYDROCHLORIDE 5 MG/1
5 TABLET ORAL EVERY 4 HOURS PRN
Status: DISCONTINUED | OUTPATIENT
Start: 2021-11-11 | End: 2021-11-11

## 2021-11-11 RX ORDER — AMOXICILLIN 250 MG
2 CAPSULE ORAL 2 TIMES DAILY
Status: DISCONTINUED | OUTPATIENT
Start: 2021-11-11 | End: 2021-11-14 | Stop reason: HOSPADM

## 2021-11-11 RX ORDER — ACETAMINOPHEN 325 MG/1
650 TABLET ORAL EVERY 4 HOURS PRN
Status: DISCONTINUED | OUTPATIENT
Start: 2021-11-11 | End: 2021-11-14 | Stop reason: HOSPADM

## 2021-11-11 RX ORDER — OXYTOCIN/0.9 % SODIUM CHLORIDE 30/500 ML
100-340 PLASTIC BAG, INJECTION (ML) INTRAVENOUS CONTINUOUS PRN
Status: DISCONTINUED | OUTPATIENT
Start: 2021-11-11 | End: 2021-11-14 | Stop reason: HOSPADM

## 2021-11-11 RX ORDER — SODIUM CHLORIDE, SODIUM LACTATE, POTASSIUM CHLORIDE, CALCIUM CHLORIDE 600; 310; 30; 20 MG/100ML; MG/100ML; MG/100ML; MG/100ML
INJECTION, SOLUTION INTRAVENOUS CONTINUOUS
Status: DISCONTINUED | OUTPATIENT
Start: 2021-11-11 | End: 2021-11-11

## 2021-11-11 RX ORDER — BUPIVACAINE HYDROCHLORIDE 7.5 MG/ML
INJECTION, SOLUTION INTRASPINAL
Status: COMPLETED | OUTPATIENT
Start: 2021-11-11 | End: 2021-11-11

## 2021-11-11 RX ORDER — CEFAZOLIN SODIUM 2 G/100ML
2 INJECTION, SOLUTION INTRAVENOUS SEE ADMIN INSTRUCTIONS
Status: DISCONTINUED | OUTPATIENT
Start: 2021-11-11 | End: 2021-11-11 | Stop reason: HOSPADM

## 2021-11-11 RX ORDER — NALOXONE HYDROCHLORIDE 0.4 MG/ML
0.4 INJECTION, SOLUTION INTRAMUSCULAR; INTRAVENOUS; SUBCUTANEOUS
Status: DISCONTINUED | OUTPATIENT
Start: 2021-11-11 | End: 2021-11-14 | Stop reason: HOSPADM

## 2021-11-11 RX ORDER — SODIUM CHLORIDE, SODIUM LACTATE, POTASSIUM CHLORIDE, CALCIUM CHLORIDE 600; 310; 30; 20 MG/100ML; MG/100ML; MG/100ML; MG/100ML
INJECTION, SOLUTION INTRAVENOUS CONTINUOUS
Status: DISCONTINUED | OUTPATIENT
Start: 2021-11-11 | End: 2021-11-11 | Stop reason: HOSPADM

## 2021-11-11 RX ORDER — CARBOPROST TROMETHAMINE 250 UG/ML
250 INJECTION, SOLUTION INTRAMUSCULAR
Status: DISCONTINUED | OUTPATIENT
Start: 2021-11-11 | End: 2021-11-14 | Stop reason: HOSPADM

## 2021-11-11 RX ORDER — SIMETHICONE 80 MG
80 TABLET,CHEWABLE ORAL 4 TIMES DAILY PRN
Status: DISCONTINUED | OUTPATIENT
Start: 2021-11-11 | End: 2021-11-14 | Stop reason: HOSPADM

## 2021-11-11 RX ORDER — OXYTOCIN 10 [USP'U]/ML
10 INJECTION, SOLUTION INTRAMUSCULAR; INTRAVENOUS
Status: DISCONTINUED | OUTPATIENT
Start: 2021-11-11 | End: 2021-11-11 | Stop reason: HOSPADM

## 2021-11-11 RX ORDER — ONDANSETRON 2 MG/ML
4 INJECTION INTRAMUSCULAR; INTRAVENOUS EVERY 6 HOURS PRN
Status: DISCONTINUED | OUTPATIENT
Start: 2021-11-11 | End: 2021-11-14 | Stop reason: HOSPADM

## 2021-11-11 RX ORDER — DEXTROSE, SODIUM CHLORIDE, SODIUM LACTATE, POTASSIUM CHLORIDE, AND CALCIUM CHLORIDE 5; .6; .31; .03; .02 G/100ML; G/100ML; G/100ML; G/100ML; G/100ML
INJECTION, SOLUTION INTRAVENOUS CONTINUOUS
Status: DISCONTINUED | OUTPATIENT
Start: 2021-11-11 | End: 2021-11-14 | Stop reason: HOSPADM

## 2021-11-11 RX ORDER — MORPHINE SULFATE 0.5 MG/ML
150 INJECTION, SOLUTION EPIDURAL; INTRATHECAL; INTRAVENOUS ONCE
Status: DISCONTINUED | OUTPATIENT
Start: 2021-11-11 | End: 2021-11-11 | Stop reason: HOSPADM

## 2021-11-11 RX ORDER — FENTANYL CITRATE 50 UG/ML
25 INJECTION, SOLUTION INTRAMUSCULAR; INTRAVENOUS EVERY 5 MIN PRN
Status: DISCONTINUED | OUTPATIENT
Start: 2021-11-11 | End: 2021-11-11

## 2021-11-11 RX ORDER — KETAMINE HYDROCHLORIDE 10 MG/ML
INJECTION INTRAMUSCULAR; INTRAVENOUS PRN
Status: DISCONTINUED | OUTPATIENT
Start: 2021-11-11 | End: 2021-11-11

## 2021-11-11 RX ORDER — HYDROMORPHONE HYDROCHLORIDE 1 MG/ML
.3-.5 INJECTION, SOLUTION INTRAMUSCULAR; INTRAVENOUS; SUBCUTANEOUS EVERY 30 MIN PRN
Status: DISCONTINUED | OUTPATIENT
Start: 2021-11-11 | End: 2021-11-14 | Stop reason: HOSPADM

## 2021-11-11 RX ORDER — ACETAMINOPHEN 325 MG/1
975 TABLET ORAL ONCE
Status: COMPLETED | OUTPATIENT
Start: 2021-11-11 | End: 2021-11-11

## 2021-11-11 RX ORDER — LIDOCAINE 40 MG/G
CREAM TOPICAL
Status: DISCONTINUED | OUTPATIENT
Start: 2021-11-11 | End: 2021-11-14 | Stop reason: HOSPADM

## 2021-11-11 RX ORDER — LIDOCAINE 40 MG/G
CREAM TOPICAL
Status: DISCONTINUED | OUTPATIENT
Start: 2021-11-11 | End: 2021-11-11 | Stop reason: HOSPADM

## 2021-11-11 RX ORDER — OXYCODONE HYDROCHLORIDE 5 MG/1
5-10 TABLET ORAL EVERY 4 HOURS PRN
Status: DISCONTINUED | OUTPATIENT
Start: 2021-11-11 | End: 2021-11-14 | Stop reason: HOSPADM

## 2021-11-11 RX ORDER — ONDANSETRON 4 MG/1
4 TABLET, ORALLY DISINTEGRATING ORAL EVERY 6 HOURS PRN
Status: DISCONTINUED | OUTPATIENT
Start: 2021-11-11 | End: 2021-11-14 | Stop reason: HOSPADM

## 2021-11-11 RX ORDER — CITRIC ACID/SODIUM CITRATE 334-500MG
30 SOLUTION, ORAL ORAL
Status: COMPLETED | OUTPATIENT
Start: 2021-11-11 | End: 2021-11-11

## 2021-11-11 RX ORDER — EPHEDRINE SULFATE 50 MG/ML
5 INJECTION, SOLUTION INTRAMUSCULAR; INTRAVENOUS; SUBCUTANEOUS
Status: DISCONTINUED | OUTPATIENT
Start: 2021-11-11 | End: 2021-11-11 | Stop reason: HOSPADM

## 2021-11-11 RX ORDER — BUPIVACAINE HYDROCHLORIDE 2.5 MG/ML
INJECTION, SOLUTION EPIDURAL; INFILTRATION; INTRACAUDAL
Status: COMPLETED | OUTPATIENT
Start: 2021-11-11 | End: 2021-11-11

## 2021-11-11 RX ORDER — ONDANSETRON 2 MG/ML
4 INJECTION INTRAMUSCULAR; INTRAVENOUS EVERY 30 MIN PRN
Status: DISCONTINUED | OUTPATIENT
Start: 2021-11-11 | End: 2021-11-11

## 2021-11-11 RX ORDER — BISACODYL 10 MG
10 SUPPOSITORY, RECTAL RECTAL DAILY PRN
Status: DISCONTINUED | OUTPATIENT
Start: 2021-11-13 | End: 2021-11-14 | Stop reason: HOSPADM

## 2021-11-11 RX ORDER — ACETAMINOPHEN 325 MG/1
650 TABLET ORAL EVERY 4 HOURS PRN
Status: DISCONTINUED | OUTPATIENT
Start: 2021-11-14 | End: 2021-11-11

## 2021-11-11 RX ORDER — CEFAZOLIN SODIUM 2 G/100ML
2 INJECTION, SOLUTION INTRAVENOUS
Status: COMPLETED | OUTPATIENT
Start: 2021-11-11 | End: 2021-11-11

## 2021-11-11 RX ORDER — FENTANYL CITRATE 50 UG/ML
INJECTION, SOLUTION INTRAMUSCULAR; INTRAVENOUS PRN
Status: DISCONTINUED | OUTPATIENT
Start: 2021-11-11 | End: 2021-11-11

## 2021-11-11 RX ORDER — IBUPROFEN 600 MG/1
600 TABLET, FILM COATED ORAL EVERY 6 HOURS PRN
Status: DISCONTINUED | OUTPATIENT
Start: 2021-11-11 | End: 2021-11-14 | Stop reason: HOSPADM

## 2021-11-11 RX ADMIN — NALBUPHINE HYDROCHLORIDE 2.5 MG: 10 INJECTION, SOLUTION INTRAMUSCULAR; INTRAVENOUS; SUBCUTANEOUS at 19:05

## 2021-11-11 RX ADMIN — SODIUM CHLORIDE, POTASSIUM CHLORIDE, SODIUM LACTATE AND CALCIUM CHLORIDE 1000 ML: 600; 310; 30; 20 INJECTION, SOLUTION INTRAVENOUS at 12:24

## 2021-11-11 RX ADMIN — FENTANYL CITRATE 50 MCG: 50 INJECTION, SOLUTION INTRAMUSCULAR; INTRAVENOUS at 14:26

## 2021-11-11 RX ADMIN — CEFAZOLIN SODIUM 2 G: 2 INJECTION, SOLUTION INTRAVENOUS at 13:42

## 2021-11-11 RX ADMIN — Medication 300 ML/HR: at 14:09

## 2021-11-11 RX ADMIN — BUPIVACAINE HYDROCHLORIDE IN DEXTROSE 1.6 ML: 7.5 INJECTION, SOLUTION SUBARACHNOID at 13:40

## 2021-11-11 RX ADMIN — ONDANSETRON 4 MG: 2 INJECTION INTRAMUSCULAR; INTRAVENOUS at 13:34

## 2021-11-11 RX ADMIN — Medication 0.15 MG: at 13:40

## 2021-11-11 RX ADMIN — BUPIVACAINE HYDROCHLORIDE 20 ML: 2.5 INJECTION, SOLUTION EPIDURAL; INFILTRATION; INTRACAUDAL at 15:00

## 2021-11-11 RX ADMIN — KETOROLAC TROMETHAMINE 30 MG: 30 INJECTION, SOLUTION INTRAMUSCULAR at 20:01

## 2021-11-11 RX ADMIN — KETOROLAC TROMETHAMINE 15 MG: 30 INJECTION, SOLUTION INTRAMUSCULAR; INTRAVENOUS at 14:58

## 2021-11-11 RX ADMIN — SENNOSIDES, DOCUSATE SODIUM 1 TABLET: 8.6; 5 TABLET ORAL at 20:03

## 2021-11-11 RX ADMIN — FENTANYL CITRATE 50 MCG: 50 INJECTION, SOLUTION INTRAMUSCULAR; INTRAVENOUS at 14:54

## 2021-11-11 RX ADMIN — ACETAMINOPHEN 975 MG: 325 TABLET ORAL at 13:01

## 2021-11-11 RX ADMIN — SODIUM CITRATE AND CITRIC ACID MONOHYDRATE 30 ML: 500; 334 SOLUTION ORAL at 13:01

## 2021-11-11 RX ADMIN — KETAMINE HYDROCHLORIDE 10 MG: 10 INJECTION, SOLUTION INTRAMUSCULAR; INTRAVENOUS at 14:39

## 2021-11-11 RX ADMIN — SODIUM CHLORIDE, POTASSIUM CHLORIDE, SODIUM LACTATE AND CALCIUM CHLORIDE: 600; 310; 30; 20 INJECTION, SOLUTION INTRAVENOUS at 12:25

## 2021-11-11 RX ADMIN — Medication 100 ML/HR: at 17:47

## 2021-11-11 RX ADMIN — BUPIVACAINE 20 ML: 13.3 INJECTION, SUSPENSION, LIPOSOMAL INFILTRATION at 15:00

## 2021-11-11 RX ADMIN — SODIUM CHLORIDE, POTASSIUM CHLORIDE, SODIUM LACTATE AND CALCIUM CHLORIDE: 600; 310; 30; 20 INJECTION, SOLUTION INTRAVENOUS at 14:15

## 2021-11-11 RX ADMIN — SODIUM CHLORIDE, SODIUM LACTATE, POTASSIUM CHLORIDE, CALCIUM CHLORIDE AND DEXTROSE MONOHYDRATE: 5; 600; 310; 30; 20 INJECTION, SOLUTION INTRAVENOUS at 20:10

## 2021-11-11 RX ADMIN — PHENYLEPHRINE HYDROCHLORIDE 0.5 MCG/KG/MIN: 10 INJECTION INTRAVENOUS at 13:42

## 2021-11-11 ASSESSMENT — ACTIVITIES OF DAILY LIVING (ADL)
DIFFICULTY_EATING/SWALLOWING: NO
DIFFICULTY_COMMUNICATING: NO
CONCENTRATING,_REMEMBERING_OR_MAKING_DECISIONS_DIFFICULTY: NO
INTERPRETER_SERVICES_OFFERED_TO_THE_PATIENT: YES
HEARING_DIFFICULTY_OR_DEAF: NO
WEAR_GLASSES_OR_BLIND: NO
TOILETING_ISSUES: NO
DRESSING/BATHING_DIFFICULTY: NO
FALL_HISTORY_WITHIN_LAST_SIX_MONTHS: NO
DOING_ERRANDS_INDEPENDENTLY_DIFFICULTY: NO
WALKING_OR_CLIMBING_STAIRS_DIFFICULTY: NO

## 2021-11-11 ASSESSMENT — MIFFLIN-ST. JEOR: SCORE: 1411.69

## 2021-11-11 NOTE — ANESTHESIA PROCEDURE NOTES
Intrathecal catheter Procedure Note    Pre-Procedure   Staff -        Anesthesiologist:  Osmel Chavis MD       Performed By: anesthesiologist       Location: OB       Procedure Start/Stop Times: 11/11/2021 1:40 PM and 11/11/2021 1:45 PM       Pre-Anesthestic Checklist: patient identified, IV checked, risks and benefits discussed, informed consent, monitors and equipment checked, pre-op evaluation, at physician/surgeon's request and post-op pain management  Timeout:       Correct Patient: Yes        Correct Procedure: Yes        Correct Site: Yes        Correct Position: Yes   Procedure Documentation  Procedure: intrathecal catheter       Patient Position: sitting       Skin prep: Chloraprep       Insertion Site: L3-4. (midline approach).       Needle Gauge: 24.        Needle Length (Inches): 4        Spinal Needle Type: Sprotte       Introducer used      # of attempts: 1 and  # of redirects:     Assessment/Narrative         Paresthesias: No.       CSF fluid: clear.      Opening pressure was cmH2O while  Sitting.      Medication(s) Administered   0.75% Hyperbaric Bupivacaine (Intrathecal), 1.6 mL  Morphine PF 1 mg/mL (Intrathecal), 0.15 mg  Medication Administration Time: 11/11/2021 1:40 PM

## 2021-11-11 NOTE — ANESTHESIA PROCEDURE NOTES
TAP Procedure Note    Pre-Procedure   Staff -        Anesthesiologist:  Osmel Chavis MD       Performed By: anesthesiologist       Procedure Start/Stop Times: 11/11/2021 2:55 PM and 11/11/2021 3:05 PM       Pre-Anesthestic Checklist: patient identified, IV checked, site marked, risks and benefits discussed, informed consent, monitors and equipment checked, pre-op evaluation, at physician/surgeon's request and post-op pain management  Timeout:       Correct Patient: Yes        Correct Procedure: Yes        Correct Site: Yes        Correct Position: Yes        Correct Laterality: Yes        Site Marked: Yes  Procedure Documentation  Procedure: TAP       Laterality: bilateral       Patient Position: supine       Skin prep: Chloraprep       Needle Gauge: 20.        Needle Length (Inches): 6        Ultrasound guided       1. Ultrasound was used to identify targeted nerve, plexus, vascular marker, or fascial plane and place a needle adjacent to it in real-time.       2. Ultrasound was used to visualize the spread of anesthetic in close proximity to the above referenced structure.       3. A permanent image is entered into the patient's record.    Assessment/Narrative         The placement was negative for: blood aspirated, painful injection and site bleeding       Paresthesias: No.      Bolus given via needle. No blood aspirated via catheter.        Secured via.        Insertion/Infusion Method: Single Shot    Medication(s) Administered   Bupivacaine 0.25% PF (Infiltration), 20 mL  Bupivacaine liposome (Exparel) 1.3% LA inj susp (Infiltration), 20 mL  Medication Administration Time: 11/11/2021 3:00 PM

## 2021-11-11 NOTE — H&P
[facility]  [birth year]    Chief Complaint:  Previous  section ×2 with term pregnancy.  History of stillbirth ×2.    History of the Present Illness:  Patient is a 31-year-old  5 para 4 living children 2 [place] female with an estimated date of delivery of 11/10/21.  In , she had a vaginal birth of a 3000 g infant at 7 months which  after 2 hours and in , she had vaginal birth of a 4000 g female infant that was stillbirth.  In , she was induced in [facility] and underwent  section and in , she underwent  section in [facility] as well.  She presented to me at 18 weeks desiring vaginal birth after .  She has been undergoing surveillance with nonstress test and biophysical profiles.  Her latest ultrasound on 21 showed the baby at the 17th percentile, 3 weeks and 1 day less than expected with the abdominal circumference lagging at 6.4%.  She changed her mind about the vaginal birth after  and wants repeat  section, so she is being brought to the hospital on 21 for repeat  section.    Past Medical History:  She denies medical problems including diabetes, high blood pressure, heart disease, rheumatoid arthritis, kidney disease, psychiatric disease, seizures, hepatitis, thyroid disease, prior blood transfusions, asthma or latex allergy.  She has had 2  sections.  She takes prenatal vitamins and iron and has no known drug allergies.    Personal, Family and Social History:  She is  and does not smoke or drink alcohol.  She is a stay-at-home mom.  Her  is employed.    Review of Systems:  Noncontributory.  She states through an  that the baby is active.    Physical Exam:  Blood pressure 108/70, weight 169 pounds, height 62 inches.  General appearance: Well-developed, well-nourished [place] female with a large gravid uterus.  Thyroid: Normal.  Lungs: Clear.  Heart: Normal sinus rhythm without  murmurs.  Abdomen: Gravid uterus with vertex presenting and fundal height 36 cm.  Extremities: No cyanosis, clubbing or edema.  Pelvic exam: Deferred today.    Assessment:  #1.  Intrauterine pregnancy at 39 weeks 4 days based on ultrasound with an BETI of 21 and at 40 weeks 1 day based on last menstrual period with an BETI of 11/10/21.  2.  History of 2 previous  sections.  Patient no longer wants vaginal birth after .  3.  History of stillbirth ×2.    Plan:  She will be brought to labor and delivery for repeat  section on 21.  The risks of the operation including infection, bleeding, anesthesia and injury to her the baby and prematurity were discussed with the patient.  I explained that it was important to get the baby delivered since it appeared to be somewhat smaller than normal and she has a history of 2 previous stillbirths.  She stated she was agreeable to that approach through an .    Alan Olson MD

## 2021-11-11 NOTE — ANESTHESIA PREPROCEDURE EVALUATION
Anesthesia Pre-Procedure Evaluation    Patient: Tammie Odonnell   MRN: [id number] : 1990        Preoperative Diagnosis: Previous  section [Z98.891]  History of stillbirth [Z87.59]    Procedure : Procedure(s):  REPEAT  SECTION          No past medical history on file.   Past Surgical History:   Procedure Laterality Date     C/SECTION, LOW TRANSVERSE        No Known Allergies   Social History     Tobacco Use     Smoking status: Never Smoker     Smokeless tobacco: Never Used   Substance Use Topics     Alcohol use: Never      Wt Readings from Last 1 Encounters:   21 74.8 kg (165 lb)        Anesthesia Evaluation   Pt has had prior anesthetic. Type: Regional.        ROS/MED HX  ENT/Pulmonary:  - neg pulmonary ROS     Neurologic:  - neg neurologic ROS     Cardiovascular:  - neg cardiovascular ROS     METS/Exercise Tolerance:     Hematologic:  - neg hematologic  ROS     Musculoskeletal:  - neg musculoskeletal ROS     GI/Hepatic:  - neg GI/hepatic ROS     Renal/Genitourinary:  - neg Renal ROS     Endo:  - neg endo ROS     Psychiatric/Substance Use:  - neg psychiatric ROS     Infectious Disease:  - neg infectious disease ROS     Malignancy:  - neg malignancy ROS     Other:            Physical Exam    Airway  airway exam normal           Respiratory Devices and Support         Dental  no notable dental history         Cardiovascular   cardiovascular exam normal          Pulmonary   pulmonary exam normal                OUTSIDE LABS:  CBC:   Lab Results   Component Value Date    WBC 9.0 2021    WBC 8.4 03/15/2021    HGB 10.3 (L) 2021    HGB 10.4 (L) 2021    HCT 33.8 (L) 2021    HCT 33.6 (L) 03/15/2021     2021     03/15/2021     BMP:   Lab Results   Component Value Date     2021    POTASSIUM 3.2 (L) 2021    CHLORIDE 109 (H) 2021    CO2 20 (L) 2021    BUN 6 (L) 2021    CR 0.64 2021     (H) 2021     COAGS:  No results found for: PTT, INR, FIBR  POC:   Lab Results   Component Value Date    HCG Positive (A) 03/15/2021     HEPATIC:   Lab Results   Component Value Date    ALBUMIN 2.7 (L) 07/24/2021    PROTTOTAL 6.2 07/24/2021    ALT <9 07/24/2021    AST 12 07/24/2021    ALKPHOS 69 07/24/2021    BILITOTAL 0.3 07/24/2021     OTHER:   Lab Results   Component Value Date    A1C 5.0 03/15/2021    JOSEY 8.3 (L) 07/24/2021    TSH 3.88 03/15/2021       Anesthesia Plan    ASA Status:  2      Anesthesia Type: Epidural.              Consents    Anesthesia Plan(s) and associated risks, benefits, and realistic alternatives discussed. Questions answered and patient/representative(s) expressed understanding.     - Discussed with:  Patient         Postoperative Care    Pain management: intrathecal morphine, Multi-modal analgesia.   PONV prophylaxis: Ondansetron (or other 5HT-3)     Comments:                Osmel Chavis MD

## 2021-11-11 NOTE — ANESTHESIA CARE TRANSFER NOTE
Patient: Tammie Odonnell    Procedure: Procedure(s):  REPEAT  SECTION       Diagnosis: Previous  section [Z98.891]  History of stillbirth [Z87.59]  Diagnosis Additional Information: No value filed.    Anesthesia Type:   Epidural     Note:    Oropharynx: oropharynx clear of all foreign objects  Level of Consciousness: drowsy  Oxygen Supplementation: room air    Independent Airway: airway patency satisfactory and stable  Dentition: dentition unchanged  Vital Signs Stable: post-procedure vital signs reviewed and stable  Report to RN Given: handoff report given  Patient transferred to: Labor and Delivery    Handoff Report: Identifed the Patient, Identified the Reponsible Provider, Reviewed the pertinent medical history, Discussed the surgical course, Reviewed Intra-OP anesthesia mangement and issues during anesthesia, Set expectations for post-procedure period and Allowed opportunity for questions and acknowledgement of understanding      Vitals:  Vitals Value Taken Time   /61 21 1516   Temp 97.7    Pulse 72    Resp 16    SpO2 100 % 21 1516   Vitals shown include unvalidated device data.    Electronically Signed By: YOHAN Shaffer CRNA  2021  3:19 PM

## 2021-11-12 LAB — HGB BLD-MCNC: 8.4 G/DL (ref 11.7–15.7)

## 2021-11-12 PROCEDURE — 250N000013 HC RX MED GY IP 250 OP 250 PS 637: Performed by: OBSTETRICS & GYNECOLOGY

## 2021-11-12 PROCEDURE — 85018 HEMOGLOBIN: CPT | Performed by: OBSTETRICS & GYNECOLOGY

## 2021-11-12 PROCEDURE — 250N000011 HC RX IP 250 OP 636: Performed by: OBSTETRICS & GYNECOLOGY

## 2021-11-12 PROCEDURE — 36415 COLL VENOUS BLD VENIPUNCTURE: CPT | Performed by: OBSTETRICS & GYNECOLOGY

## 2021-11-12 PROCEDURE — 120N000001 HC R&B MED SURG/OB

## 2021-11-12 RX ORDER — KETOROLAC TROMETHAMINE 30 MG/ML
30 INJECTION, SOLUTION INTRAMUSCULAR; INTRAVENOUS EVERY 6 HOURS PRN
Status: DISCONTINUED | OUTPATIENT
Start: 2021-11-12 | End: 2021-11-14 | Stop reason: HOSPADM

## 2021-11-12 RX ADMIN — OXYCODONE HYDROCHLORIDE 5 MG: 5 TABLET ORAL at 19:04

## 2021-11-12 RX ADMIN — OXYCODONE HYDROCHLORIDE 5 MG: 5 TABLET ORAL at 14:49

## 2021-11-12 RX ADMIN — IBUPROFEN 600 MG: 600 TABLET, FILM COATED ORAL at 13:52

## 2021-11-12 RX ADMIN — KETOROLAC TROMETHAMINE 30 MG: 30 INJECTION, SOLUTION INTRAMUSCULAR at 07:41

## 2021-11-12 RX ADMIN — SENNOSIDES, DOCUSATE SODIUM 1 TABLET: 8.6; 5 TABLET ORAL at 09:35

## 2021-11-12 RX ADMIN — OXYCODONE HYDROCHLORIDE 5 MG: 5 TABLET ORAL at 09:56

## 2021-11-12 RX ADMIN — ACETAMINOPHEN 650 MG: 325 TABLET ORAL at 05:48

## 2021-11-12 RX ADMIN — SODIUM CHLORIDE, SODIUM LACTATE, POTASSIUM CHLORIDE, CALCIUM CHLORIDE AND DEXTROSE MONOHYDRATE: 5; 600; 310; 30; 20 INJECTION, SOLUTION INTRAVENOUS at 04:24

## 2021-11-12 RX ADMIN — ACETAMINOPHEN 650 MG: 325 TABLET ORAL at 20:11

## 2021-11-12 RX ADMIN — ACETAMINOPHEN 650 MG: 325 TABLET ORAL at 13:52

## 2021-11-12 RX ADMIN — IBUPROFEN 600 MG: 600 TABLET, FILM COATED ORAL at 20:11

## 2021-11-12 NOTE — OP NOTE
21 [facility]  [birth year]    Operative note    Preoperative diagnosis: #1.  Intrauterine pregnancy at 39 weeks 4 days.  2.  Previous  section ×2.  3.  History of stillbirth ×2.    Postoperative diagnosis: 1.  Same.    Procedure: Repeat low transverse  section.    Surgeon: Wesley.    Anesthetic agent: Spinal.    Specimens: None.    Findings: See operative note.    Description of the operation: The patient was placed in the supine position with the table left lateral tilt after spinal anesthesia was placed.  The abdomen was prepped and draped for  section in the routine manner with a Erwin catheter draining urinary bladder.  The skin was tested and found to be completely anesthetized.  A Pfannenstiel incision was made through the skin in the old scar excised.  It was carried through the subcutaneous tissue, fascia and peritoneum without difficulty.  Bladder peritoneum was divided transversely and the bladder flap bluntly developed.  Uterus was incised with a knife and incision extended laterally by tearing with the fingers.  Delivery was accomplished of a 6 lbs. 3 oz. female infant with Apgar scores of 8 and 9.  The placenta delivered spontaneously.  Hysterotomy wound was closed in 2 layers of 0 PDS, the first running in the second imbricating over the first.  The visceral peritoneum was reapproximated with a running 3-0 Vicryl suture.  The wound appeared dry.  The pelvis was irrigated with 500 cc of normal saline and found to be dry.  The abdominal wound is closed with running 3-0 Vicryl suture on the peritoneum everting the edges and to the wound, 3-0 Vicryl in the muscle, double-stranded 0 PDS on the fascia, 3-0 Vicryl in 2 layers on the subcutaneous tissue and a 3-0 Vicryl subcuticular for the skin.  Estimated blood loss was 500 cc.  The patient tolerated the operation well and was returned to the recovery room in good condition.  Sponge and needle counts were correct.  The Erwin  catheter was draining clear urine.  Patient did have some sensation of surgical awareness when I was closing the parietal peritoneum and she was given something to her vein that helped.    Alan Olson MD

## 2021-11-12 NOTE — PLAN OF CARE
Problem: Adult Inpatient Plan of Care  Goal: Patient-Specific Goal (Individualized)  Outcome: Improving  Flowsheets (Taken 2021)  Patient-Specific Goals (Include Timeframe): Pt will ambulate by 0600.     Problem: Bleeding (Postpartum  Delivery)  Goal: Hemostasis  Outcome: Improving   During initial assessment, pt had moderate bleeding with one large clot size of golf ball. Fundus firm, midline, at umbilicus. Abdominal dressing c/d/i. VS remain stable. Pt able to stand up at bedside but reported feeling dizzy, MD notified. Will keep Erwin in until pt more stable per order. Will continue to monitor.      Problem: Pain (Postpartum  Delivery)  Goal: Acceptable Pain Control  Outcome: Improving  Intervention: Prevent or Manage Pain  Recent Flowsheet Documentation  Taken 2021 by Tayler Arellano, RN  Pain Management Interventions:    emotional support    rest  Taken 2021 by Tayler Arellano, RN  Pain Management Interventions: medication (see MAR)  Taken 2021 1934 by Tayler Arellano, RN  Pain Management Interventions:    medication (see MAR)    emotional support    rest   Pt rates pain 2-3/10, PRN Toradol and PRN Tylenol effective in pain relief. Will continue to monitor/intervene as needed.

## 2021-11-12 NOTE — ANESTHESIA POSTPROCEDURE EVALUATION
Patient: Tammie Odonnell    Procedure: Procedure(s):  REPEAT  SECTION       Diagnosis:Previous  section [Z98.891]  History of stillbirth [Z87.59]  Diagnosis Additional Information: No value filed.    Anesthesia Type:  Epidural    Note:  Disposition: Inpatient   Postop Pain Control: Uneventful            Sign Out: Well controlled pain   PONV: No   Neuro/Psych: Uneventful            Sign Out: Acceptable/Baseline neuro status   Airway/Respiratory: Uneventful            Sign Out: Acceptable/Baseline resp. status   CV/Hemodynamics: Uneventful            Sign Out: Acceptable CV status; No obvious hypovolemia; No obvious fluid overload   Other NRE: NONE   DID A NON-ROUTINE EVENT OCCUR? No           Last vitals:  Vitals Value Taken Time   BP 96/55 21 0544   Temp 36.4  C (97.5  F) 21 0544   Pulse 85 21 0544   Resp 16 21 0544   SpO2 100 % 21 0628   Vitals shown include unvalidated device data.    Electronically Signed By: Olivier Penaloza MD  2021  6:32 AM

## 2021-11-13 PROCEDURE — 120N000001 HC R&B MED SURG/OB

## 2021-11-13 PROCEDURE — 250N000013 HC RX MED GY IP 250 OP 250 PS 637: Performed by: OBSTETRICS & GYNECOLOGY

## 2021-11-13 RX ADMIN — ACETAMINOPHEN 650 MG: 325 TABLET ORAL at 10:44

## 2021-11-13 RX ADMIN — ACETAMINOPHEN 650 MG: 325 TABLET ORAL at 00:15

## 2021-11-13 RX ADMIN — ACETAMINOPHEN 650 MG: 325 TABLET ORAL at 04:26

## 2021-11-13 RX ADMIN — SIMETHICONE 80 MG: 80 TABLET, CHEWABLE ORAL at 00:15

## 2021-11-13 RX ADMIN — OXYCODONE HYDROCHLORIDE 10 MG: 5 TABLET ORAL at 00:15

## 2021-11-13 RX ADMIN — ACETAMINOPHEN 650 MG: 325 TABLET ORAL at 22:11

## 2021-11-13 RX ADMIN — IBUPROFEN 600 MG: 600 TABLET, FILM COATED ORAL at 02:03

## 2021-11-13 RX ADMIN — OXYCODONE HYDROCHLORIDE 10 MG: 5 TABLET ORAL at 04:26

## 2021-11-13 RX ADMIN — OXYCODONE HYDROCHLORIDE 5 MG: 5 TABLET ORAL at 19:42

## 2021-11-13 RX ADMIN — IBUPROFEN 600 MG: 600 TABLET, FILM COATED ORAL at 18:11

## 2021-11-13 RX ADMIN — SENNOSIDES, DOCUSATE SODIUM 2 TABLET: 8.6; 5 TABLET ORAL at 00:15

## 2021-11-13 RX ADMIN — ACETAMINOPHEN 650 MG: 325 TABLET ORAL at 18:11

## 2021-11-13 RX ADMIN — IBUPROFEN 600 MG: 600 TABLET, FILM COATED ORAL at 10:44

## 2021-11-13 NOTE — PROVIDER NOTIFICATION
11/13/21 1107   Provider Notification   Provider Name/Title Dr Olson   Method of Notification Phone   Request Evaluate-Remote   Notification Reason Status Update       Contacted re: pt complaints of dizziness. VS WDL. Patient did take two oxycodone @ 0426. No new orders at this time, will continue to monitor and update physician with any new or worsening symptoms.

## 2021-11-13 NOTE — PLAN OF CARE
Problem: Pain (Postpartum  Delivery)  Goal: Acceptable Pain Control  Outcome: Improving  Intervention: Prevent or Manage Pain  Recent Flowsheet Documentation  Taken 2021 by Vianney Gomez RN  Pain Management Interventions: medication (see MAR)   Pt pain is well managed with tylenol 650 mg and Motrin 600 mg. She is independent with self and baby care Vianney Gomez RN   : Pt called for pain meds . Writer has given meds at  Both tylenol 650 mg and Motrin 600 mg. RN explained to pt the earliest meds will be administered again will be at 2330.Pt's  who speaks and understand english interpreted to pt.pt understood. RN offered pt warm pack while waiting for PRN med to be due.  @2328, RN went in to offer pt her meds , tylenol 650 mg, Roxicodone 10 mg simethicone 80 mg.Pt refuse medication. RN used [employer]  x 3 , [employer] said no [facility]  available at this time. RN explained to pt if she doesn't take her meds now, her pain will be back and even stronger. She insist she does not want to take it and she is no more in pain. Writer requested for 2nd RN Marianna Swanson to offer med, pt still refuse. Charge RN on duty Iris was notified and went to see pt Pt still refused med, pt assignment was changed and Marianna MCKEON took over pt assignment , Pt finally took pain meds at 0015. Full report given to Marianna Swanson. LINDA Gomez RN

## 2021-11-13 NOTE — PROGRESS NOTES
11-13-21    S: Feeling some better. Pain last pm so severe she was in tears.    O: VSS afebrile  Abd: Soft. Wound appears to be healing well. Mild po tenderness.    Hg: preop 10.3. Yesterday 8.4    A: POD #2, doing well.  Chronic anemia, now acute pp anemia.    P: Ambulate, pain control. Probable discharge in am.    Lucho Olson MD

## 2021-11-14 VITALS
HEIGHT: 62 IN | SYSTOLIC BLOOD PRESSURE: 103 MMHG | BODY MASS INDEX: 30.36 KG/M2 | RESPIRATION RATE: 16 BRPM | DIASTOLIC BLOOD PRESSURE: 69 MMHG | TEMPERATURE: 97.7 F | WEIGHT: 165 LBS | OXYGEN SATURATION: 97 % | HEART RATE: 85 BPM

## 2021-11-14 PROCEDURE — 250N000013 HC RX MED GY IP 250 OP 250 PS 637: Performed by: OBSTETRICS & GYNECOLOGY

## 2021-11-14 RX ORDER — OXYCODONE HYDROCHLORIDE 5 MG/1
TABLET ORAL
Qty: 12 TABLET | Refills: 0 | Status: SHIPPED | OUTPATIENT
Start: 2021-11-14 | End: 2022-05-24

## 2021-11-14 RX ADMIN — ACETAMINOPHEN 650 MG: 325 TABLET ORAL at 06:24

## 2021-11-14 RX ADMIN — IBUPROFEN 600 MG: 600 TABLET, FILM COATED ORAL at 11:37

## 2021-11-14 RX ADMIN — IBUPROFEN 600 MG: 600 TABLET, FILM COATED ORAL at 06:24

## 2021-11-14 RX ADMIN — ACETAMINOPHEN 650 MG: 325 TABLET ORAL at 02:17

## 2021-11-14 RX ADMIN — IBUPROFEN 600 MG: 600 TABLET, FILM COATED ORAL at 00:23

## 2021-11-14 RX ADMIN — SENNOSIDES, DOCUSATE SODIUM 2 TABLET: 8.6; 5 TABLET ORAL at 11:36

## 2021-11-14 NOTE — PLAN OF CARE
Patient's VSS.  Bleeding and fundus WDL.  Patient pain is controlled with scheduled and as needed medications. Incision clean, dry and intact with no s/s of infection.  Voiding and ambulating.  Attentive to infant needs and bonding well.  No concerns at this time.  Continue routine care.  Offer support, encouragement and education as needed.

## 2021-11-14 NOTE — PLAN OF CARE
Instructions after a  Section    Congratulations on the birth of your baby girl!    Because you have a large abdominal wound, please do not lift heavier than fifteen pounds for six weeks. You should let someone else push the vacuum sweeper and lift laundry baskets for six weeks.    Please take stairs slowly one at a time until the pain is less and your energy has returned. Do not drive until the pain is less and you are off pain medicines. (Ususally about two weeks.)    Please watch for infection by taking your temperature every evening the first week at home. If it goes above 100 degrees, please call me. Please call if your incision gets hot, red, painful or swells. If your bleeding is heavy, bright red, changing a pad an hour, please call me.    You may walk as much as you like. You will fatigue easily for six weeks. Showering and bathing are fine. Pat the incision dry. Please leave the steri-strips on until I see you in two weeks. If they come off on their own, that's okay.    Please continue your prenatal vitamins while nursing or for six weeks if not nursing. You will receive a prescription for Oxycodone for pain. Use if needed. You may combine the Oxycodone with Ibuprofen and/or Tylenol. If you do not need the Oxycodone, do not take it.    Please return to the office in two and six weeks for post operative visits. The office number to schedule an appointment is [phone]. My emergency after-hours number is [phone].        Lucho Olson MD

## 2021-11-14 NOTE — PROGRESS NOTES
11-14-21    S: Feels well.    O: VSS Afebrile  Abd: Soft. Wound healing well    A: POD #3, doing well.    P: Discharge home. RTC 2 and 6 weeks. Routine instructions. A Pos. Immune to rubella. Oxycodone.    Lucho Olson MD

## 2021-11-14 NOTE — PLAN OF CARE
Patient progressing as expected for postpartum day two.,  delivery. Patient independent with cares, pain well controlled with ibuprofen and tylenol. Patient is formula feeding baby. Fundus firm at U/1. Continuing to monitor.

## 2021-11-24 NOTE — DISCHARGE SUMMARY
21 [facility]  [birth year]    Discharge summary    Date admitted: 2021    Date discharged: 2021    Admitting diagnosis: #1.  Intrauterine pregnancy at 39 weeks 4 days.  2.  History of 2 previous  sections.  Patient no longer wants vaginal birth after .  3.  History of stillbirth x2.    Discharge diagnosis: #1.  Same.  2.  Acute surgical blood loss anemia and postpartum anemia.    Procedures: 2021, repeat low transverse  section.    Disposition: Patient is discharged home in good condition.    Hospital course: Patient is a 31-year-old  5 now para 5 living children 3 female from [place] who has an estimated date of delivery of 11/10/2021.  She planned on vaginal birth after  to changed her mind and now wants a repeat  section.  She was taken to the operating room on 2021 where repeat low transverse  section was performed with birth of a 6 pound 3 ounce female infant with Apgar scores of 8 and 9.  Her surgery was performed without complication.  Her postoperative course was uncomplicated.  She remained afebrile and had normal return of bladder function.  She was discharged with instructions to return to the office in 2 weeks in 6 weeks for postpartum and postoperative visits.  Routine discharge instructions were discussed.  She was discharged on oxycodone 5 mg to take 1 or 2 every 4 hours as needed for pain, 12 tablets with no refills.  She was to continue her prenatal vitamins.  Her preoperative hemoglobin was 10.3 and her day 1 hemoglobin was 8.4 making the diagnosis of chronic anemia of undetermined etiology and acute blood loss anemia.    Alan Olson MD

## 2021-11-29 ENCOUNTER — OFFICE VISIT (OUTPATIENT)
Dept: CARDIOLOGY | Facility: CLINIC | Age: 31
End: 2021-11-29
Payer: COMMERCIAL

## 2021-11-29 VITALS
HEIGHT: 62 IN | DIASTOLIC BLOOD PRESSURE: 68 MMHG | BODY MASS INDEX: 29.9 KG/M2 | SYSTOLIC BLOOD PRESSURE: 110 MMHG | RESPIRATION RATE: 12 BRPM | HEART RATE: 82 BPM | WEIGHT: 162.5 LBS

## 2021-11-29 DIAGNOSIS — R01.1 HEART MURMUR: Primary | ICD-10-CM

## 2021-11-29 PROCEDURE — 99204 OFFICE O/P NEW MOD 45 MIN: CPT | Mod: 25 | Performed by: INTERNAL MEDICINE

## 2021-11-29 PROCEDURE — 93000 ELECTROCARDIOGRAM COMPLETE: CPT | Performed by: GENERAL ACUTE CARE HOSPITAL

## 2021-11-29 RX ORDER — IBUPROFEN 200 MG
200 TABLET ORAL EVERY 4 HOURS PRN
COMMUNITY
End: 2022-05-24

## 2021-11-29 ASSESSMENT — MIFFLIN-ST. JEOR: SCORE: 1400.35

## 2021-11-29 NOTE — PATIENT INSTRUCTIONS
It was a pleasure seeing you at [facility] today.        Here are my suggestions for your care:    1.  EKG in clinic today    2.  Echocardiogram at [facility] or [facility]. My  will call you to get this arranged.    3.  We will call you with the results and recommendations      You can always call my nurse Rosa Maria Schmidt RN who is a nurse helping me in the care of my patients. She can be reached at [phone] if you have any questions.    For scheduling, please call my  Liliam Royal at [phone].    Thank you again for trusting me with your care. Please feel free to call my office at any time if you have any question or if I can assist you in any way.    Adeola Rivera MD  [facility]

## 2021-11-29 NOTE — PROGRESS NOTES
"  HEART CARE ENCOUNTER CONSULTATON NOTE      [facility]  [phone]      Assessment/Recommendations   Assessment/Plan:  Early systolic murmur at the upper sternal borders, radiates to the carotids - I suspect this is likely a bicuspid aortic valve based on her age and lack of symptoms. I will order an EKG and echocardiogram. No clinical concern for heart failure on exam today. EKG is normal.    Follow up pending the echo results       History of Present Illness/Subjective    HPI: Tammie Odonnell is a 31 year old female from [facility] who has also lived in [facility] and recently gave birth 11/11/21 via c-sxn. She is otherwise healthy and recently saw her PCP, but those notes are not available to me today. She complained of bilateral breast pain that was breast and not chest. She reports that the doctor listened to her chest and recommended that she be seen by cardiology. Tammie is active at home and has noted no dyspnea, chest pain, palpitations, pnd/orhtopnea or edema. Her appetite is good. She has chronic stable persistent dizziness from a distant head injury. She denies syncope but notes a fall due to weak legs when anemic in 2017.       Physical Examination  Review of Systems   Vitals: /68 (BP Location: Right arm, Patient Position: Sitting, Cuff Size: Adult Regular)   Pulse 82   Resp 12   Ht 1.575 m (5' 2\")   Wt 73.7 kg (162 lb 8 oz)   LMP 02/07/2021   BMI 29.72 kg/m    BMI= Body mass index is 29.72 kg/m .  Wt Readings from Last 3 Encounters:   11/29/21 73.7 kg (162 lb 8 oz)   11/11/21 74.8 kg (165 lb)   03/15/21 73.3 kg (161 lb 8 oz)       General Appearance:   no distress, normal body habitus   ENT/Mouth: membranes moist, no oral lesions or bleeding gums.      EYES:  no scleral icterus, normal conjunctivae   Neck: no carotid bruits or thyromegaly   Chest/Lungs:   lungs are clear to auscultation, no rales or wheezing, no sternal scar, equal chest wall expansion    Cardiovascular:   " Regular. Normal first and second heart sounds with early systolic murmur that radiates to her carotids. No rubs or gallops; the right carotid, radial and posterior tibial pulses are intact and the left carotid, radial and posterior tibial pulses are intact.  Jugular venous pressure is flat, no edema bilaterally    Abdomen:  no organomegaly, masses, bruits, or tenderness; bowel sounds are present   Extremities: no cyanosis or clubbing   Skin: no xanthelasma, warm.    Neurologic: normal  bilateral, no tremors     Psychiatric: alert and oriented x3, calm        Please refer above for cardiac ROS details.        Medical History  Surgical History Family History Social History   History reviewed. No pertinent past medical history.  Past Surgical History:   Procedure Laterality Date     C/SECTION, LOW TRANSVERSE        SECTION N/A 2021    Procedure: REPEAT  SECTION;  Surgeon: Alan Olson MD;  Location: [facility] OR     Family History   Problem Relation Age of Onset     No Known Problems Mother      No Known Problems Father      No Known Problems Maternal Grandmother      No Known Problems Maternal Grandfather      No Known Problems Paternal Grandmother      No Known Problems Paternal Grandfather      No Known Problems Brother      No Known Problems Sister      No Known Problems Son      No Known Problems Daughter      No Known Problems Maternal Half-Brother      No Known Problems Maternal Half-Sister      No Known Problems Paternal Half-Brother      No Known Problems Paternal Half-Sister      No Known Problems Niece      No Known Problems Nephew      No Known Problems Cousin      No Known Problems Other      Diabetes No family hx of      Coronary Artery Disease No family hx of      Hypertension No family hx of      Hyperlipidemia No family hx of      Cerebrovascular Disease No family hx of      Breast Cancer No family hx of      Colon Cancer No family hx of      Prostate Cancer  No family hx of      Depression No family hx of      Anxiety Disorder No family hx of      Mental Illness No family hx of      Substance Abuse No family hx of      Anesthesia Reaction No family hx of      Asthma No family hx of      Osteoporosis No family hx of      Genetic Disorder No family hx of      Thyroid Disease No family hx of      Obesity No family hx of      Unknown/Adopted No family hx of         Social History     Socioeconomic History     Marital status:      Spouse name: Not on file     Number of children: Not on file     Years of education: Not on file     Highest education level: Not on file   Occupational History     Not on file   Tobacco Use     Smoking status: Never Smoker     Smokeless tobacco: Never Used   Substance and Sexual Activity     Alcohol use: Never     Drug use: Never     Sexual activity: Yes     Partners: Male   Other Topics Concern     Parent/sibling w/ CABG, MI or angioplasty before 65F 55M? Not Asked   Social History Narrative     Not on file     Social Determinants of Health     Financial Resource Strain: Not on file   Food Insecurity: Not on file   Transportation Needs: Not on file   Physical Activity: Not on file   Stress: Not on file   Social Connections: Not on file   Intimate Partner Violence: Not on file   Housing Stability: Not on file           Medications  Allergies   Current Outpatient Medications   Medication Sig Dispense Refill     ferrous sulfate 325 (65 FE) MG tablet [FERROUS SULFATE 325 (65 FE) MG TABLET] Take 1 tablet (325 mg total) by mouth daily with breakfast. 90 tablet 0     ibuprofen (ADVIL/MOTRIN) 200 MG tablet Take 200 mg by mouth every 4 hours as needed for mild pain       prenatal vit no.130-iron-folic (PRENATAL VITAMIN) 27 mg iron- 800 mcg Tab tablet [PRENATAL VIT NO.130-IRON-FOLIC (PRENATAL VITAMIN) 27 MG IRON- 800 MCG TAB TABLET] Take 1 tablet by mouth daily. 90 tablet 3     oxyCODONE (ROXICODONE) 5 MG tablet One or two tablets PO q4h prn pain.  May take with Ibuprofen and/or Tylenol. Max of six/day. (Patient not taking: Reported on 11/29/2021) 12 tablet 0     No Known Allergies       Lab Results    Chemistry/lipid CBC Cardiac Enzymes/BNP/TSH/INR   Recent Labs   Lab Test 03/15/21  1551   CHOL 125   HDL 44*   LDL 70   TRIG 55     Recent Labs   Lab Test 03/15/21  1551   LDL 70     Recent Labs   Lab Test 07/24/21  0259      POTASSIUM 3.2*   CHLORIDE 109*   CO2 20*   *   BUN 6*   CR 0.64   GFRESTIMATED >90   JOSEY 8.3*     Recent Labs   Lab Test 07/24/21  0259   CR 0.64     Recent Labs   Lab Test 03/15/21  1551   A1C 5.0          Recent Labs   Lab Test 11/12/21 0443 11/11/21 1116 07/24/21  0259   WBC  --   --  9.0   HGB 8.4*   < > 10.4*   HCT  --   --  33.8*   MCV  --   --  82   PLT  --   --  164    < > = values in this interval not displayed.     Recent Labs   Lab Test 11/12/21 0443 11/11/21 1116 07/24/21  0259   HGB 8.4* 10.3* 10.4*    No results for input(s): TROPONINI in the last 81017 hours.  No results for input(s): BNP, NTBNPI, NTBNP in the last 20196 hours.  Recent Labs   Lab Test 03/15/21  1551   TSH 3.88     No results for input(s): INR in the last 90795 hours.           Adeola Rivera MD

## 2021-11-29 NOTE — LETTER
Date:February 16, 2022      Patient was self referred, no letter generated. Do not send.        [facility] Information

## 2021-11-29 NOTE — LETTER
"11/29/2021    Physician No Ref-Primary  No address on file    RE: Tammie Odonnell       Dear Colleague,    I had the pleasure of seeing Tammie Odonnell in the [facility].      HEART CARE ENCOUNTER CONSULTATON NOTE      [facility]  [phone]      Assessment/Recommendations   Assessment/Plan:  Early systolic murmur at the upper sternal borders, radiates to the carotids - I suspect this is likely a bicuspid aortic valve based on her age and lack of symptoms. I will order an EKG and echocardiogram. No clinical concern for heart failure on exam today. EKG is normal.    Follow up pending the echo results       History of Present Illness/Subjective    HPI: Tammie Odonnell is a 31 year old female from [facility] who has also lived in [place] and recently gave birth 11/11/21 via c-sxn. She is otherwise healthy and recently saw her PCP, but those notes are not available to me today. She complained of bilateral breast pain that was breast and not chest. She reports that the doctor listened to her chest and recommended that she be seen by cardiology. Tammie is active at home and has noted no dyspnea, chest pain, palpitations, pnd/orhtopnea or edema. Her appetite is good. She has chronic stable persistent dizziness from a distant head injury. She denies syncope but notes a fall due to weak legs when anemic in 2017.       Physical Examination  Review of Systems   Vitals: /68 (BP Location: Right arm, Patient Position: Sitting, Cuff Size: Adult Regular)   Pulse 82   Resp 12   Ht 1.575 m (5' 2\")   Wt 73.7 kg (162 lb 8 oz)   LMP 02/07/2021   BMI 29.72 kg/m    BMI= Body mass index is 29.72 kg/m .  Wt Readings from Last 3 Encounters:   11/29/21 73.7 kg (162 lb 8 oz)   11/11/21 74.8 kg (165 lb)   03/15/21 73.3 kg (161 lb 8 oz)       General Appearance:   no distress, normal body habitus   ENT/Mouth: membranes moist, no oral lesions or bleeding gums.      EYES:  " no scleral icterus, normal conjunctivae   Neck: no carotid bruits or thyromegaly   Chest/Lungs:   lungs are clear to auscultation, no rales or wheezing, no sternal scar, equal chest wall expansion    Cardiovascular:   Regular. Normal first and second heart sounds with early systolic murmur that radiates to her carotids. No rubs or gallops; the right carotid, radial and posterior tibial pulses are intact and the left carotid, radial and posterior tibial pulses are intact.  Jugular venous pressure is flat, no edema bilaterally    Abdomen:  no organomegaly, masses, bruits, or tenderness; bowel sounds are present   Extremities: no cyanosis or clubbing   Skin: no xanthelasma, warm.    Neurologic: normal  bilateral, no tremors     Psychiatric: alert and oriented x3, calm        Please refer above for cardiac ROS details.        Medical History  Surgical History Family History Social History   History reviewed. No pertinent past medical history.  Past Surgical History:   Procedure Laterality Date     C/SECTION, LOW TRANSVERSE        SECTION N/A 2021    Procedure: REPEAT  SECTION;  Surgeon: Alan Olson MD;  Location: [facility] OR     Family History   Problem Relation Age of Onset     No Known Problems Mother      No Known Problems Father      No Known Problems Maternal Grandmother      No Known Problems Maternal Grandfather      No Known Problems Paternal Grandmother      No Known Problems Paternal Grandfather      No Known Problems Brother      No Known Problems Sister      No Known Problems Son      No Known Problems Daughter      No Known Problems Maternal Half-Brother      No Known Problems Maternal Half-Sister      No Known Problems Paternal Half-Brother      No Known Problems Paternal Half-Sister      No Known Problems Niece      No Known Problems Nephew      No Known Problems Cousin      No Known Problems Other      Diabetes No family hx of      Coronary Artery Disease No  family hx of      Hypertension No family hx of      Hyperlipidemia No family hx of      Cerebrovascular Disease No family hx of      Breast Cancer No family hx of      Colon Cancer No family hx of      Prostate Cancer No family hx of      Depression No family hx of      Anxiety Disorder No family hx of      Mental Illness No family hx of      Substance Abuse No family hx of      Anesthesia Reaction No family hx of      Asthma No family hx of      Osteoporosis No family hx of      Genetic Disorder No family hx of      Thyroid Disease No family hx of      Obesity No family hx of      Unknown/Adopted No family hx of         Social History     Socioeconomic History     Marital status:      Spouse name: Not on file     Number of children: Not on file     Years of education: Not on file     Highest education level: Not on file   Occupational History     Not on file   Tobacco Use     Smoking status: Never Smoker     Smokeless tobacco: Never Used   Substance and Sexual Activity     Alcohol use: Never     Drug use: Never     Sexual activity: Yes     Partners: Male   Other Topics Concern     Parent/sibling w/ CABG, MI or angioplasty before 65F 55M? Not Asked   Social History Narrative     Not on file     Social Determinants of Health     Financial Resource Strain: Not on file   Food Insecurity: Not on file   Transportation Needs: Not on file   Physical Activity: Not on file   Stress: Not on file   Social Connections: Not on file   Intimate Partner Violence: Not on file   Housing Stability: Not on file           Medications  Allergies   Current Outpatient Medications   Medication Sig Dispense Refill     ferrous sulfate 325 (65 FE) MG tablet [FERROUS SULFATE 325 (65 FE) MG TABLET] Take 1 tablet (325 mg total) by mouth daily with breakfast. 90 tablet 0     ibuprofen (ADVIL/MOTRIN) 200 MG tablet Take 200 mg by mouth every 4 hours as needed for mild pain       prenatal vit no.130-iron-folic (PRENATAL VITAMIN) 27 mg iron-  800 mcg Tab tablet [PRENATAL VIT NO.130-IRON-FOLIC (PRENATAL VITAMIN) 27 MG IRON- 800 MCG TAB TABLET] Take 1 tablet by mouth daily. 90 tablet 3     oxyCODONE (ROXICODONE) 5 MG tablet One or two tablets PO q4h prn pain. May take with Ibuprofen and/or Tylenol. Max of six/day. (Patient not taking: Reported on 11/29/2021) 12 tablet 0     No Known Allergies       Lab Results    Chemistry/lipid CBC Cardiac Enzymes/BNP/TSH/INR   Recent Labs   Lab Test 03/15/21  1551   CHOL 125   HDL 44*   LDL 70   TRIG 55     Recent Labs   Lab Test 03/15/21  1551   LDL 70     Recent Labs   Lab Test 07/24/21  0259      POTASSIUM 3.2*   CHLORIDE 109*   CO2 20*   *   BUN 6*   CR 0.64   GFRESTIMATED >90   JOSEY 8.3*     Recent Labs   Lab Test 07/24/21  0259   CR 0.64     Recent Labs   Lab Test 03/15/21  1551   A1C 5.0          Recent Labs   Lab Test 11/12/21 0443 11/11/21 1116 07/24/21  0259   WBC  --   --  9.0   HGB 8.4*   < > 10.4*   HCT  --   --  33.8*   MCV  --   --  82   PLT  --   --  164    < > = values in this interval not displayed.     Recent Labs   Lab Test 11/12/21 0443 11/11/21 1116 07/24/21  0259   HGB 8.4* 10.3* 10.4*    No results for input(s): TROPONINI in the last 00338 hours.  No results for input(s): BNP, NTBNPI, NTBNP in the last 72938 hours.  Recent Labs   Lab Test 03/15/21  1551   TSH 3.88     No results for input(s): INR in the last 03393 hours.           Adeola Rivera MD                                        Thank you for allowing me to participate in the care of your patient.      Sincerely,     Adeola iRvera MD     [facility]  cc:   No referring provider defined for this encounter.

## 2021-11-30 ENCOUNTER — HOSPITAL ENCOUNTER (OUTPATIENT)
Dept: CARDIOLOGY | Facility: HOSPITAL | Age: 31
Discharge: HOME | End: 2021-11-30
Attending: INTERNAL MEDICINE | Admitting: INTERNAL MEDICINE
Payer: COMMERCIAL

## 2021-11-30 DIAGNOSIS — R01.1 HEART MURMUR: ICD-10-CM

## 2021-11-30 LAB
ATRIAL RATE - MUSE: 77 BPM
DIASTOLIC BLOOD PRESSURE - MUSE: NORMAL MMHG
INTERPRETATION ECG - MUSE: NORMAL
P AXIS - MUSE: 38 DEGREES
PR INTERVAL - MUSE: 160 MS
QRS DURATION - MUSE: 84 MS
QT - MUSE: 380 MS
QTC - MUSE: 430 MS
R AXIS - MUSE: 30 DEGREES
SYSTOLIC BLOOD PRESSURE - MUSE: NORMAL MMHG
T AXIS - MUSE: 16 DEGREES
VENTRICULAR RATE- MUSE: 77 BPM

## 2021-11-30 PROCEDURE — 93306 TTE W/DOPPLER COMPLETE: CPT

## 2021-11-30 PROCEDURE — 93306 TTE W/DOPPLER COMPLETE: CPT | Mod: 26 | Performed by: INTERNAL MEDICINE

## 2021-12-06 ENCOUNTER — APPOINTMENT (OUTPATIENT)
Dept: INTERPRETER SERVICES | Facility: CLINIC | Age: 31
End: 2021-12-06
Payer: COMMERCIAL

## 2021-12-09 ENCOUNTER — MEDICAL CORRESPONDENCE (OUTPATIENT)
Dept: HEALTH INFORMATION MANAGEMENT | Facility: CLINIC | Age: 31
End: 2021-12-09
Payer: COMMERCIAL

## 2021-12-14 DIAGNOSIS — E61.1 IRON DEFICIENCY: Primary | ICD-10-CM

## 2021-12-14 RX ORDER — ALBUTEROL SULFATE 0.83 MG/ML
2.5 SOLUTION RESPIRATORY (INHALATION)
Status: CANCELLED | OUTPATIENT
Start: 2021-12-14

## 2021-12-14 RX ORDER — HEPARIN SODIUM (PORCINE) LOCK FLUSH IV SOLN 100 UNIT/ML 100 UNIT/ML
5 SOLUTION INTRAVENOUS
Status: CANCELLED | OUTPATIENT
Start: 2021-12-14

## 2021-12-14 RX ORDER — EPINEPHRINE 1 MG/ML
0.3 INJECTION, SOLUTION, CONCENTRATE INTRAVENOUS EVERY 5 MIN PRN
Status: CANCELLED | OUTPATIENT
Start: 2021-12-14

## 2021-12-14 RX ORDER — MEPERIDINE HYDROCHLORIDE 25 MG/ML
25 INJECTION INTRAMUSCULAR; INTRAVENOUS; SUBCUTANEOUS EVERY 30 MIN PRN
Status: CANCELLED | OUTPATIENT
Start: 2021-12-14

## 2021-12-14 RX ORDER — METHYLPREDNISOLONE SODIUM SUCCINATE 125 MG/2ML
125 INJECTION, POWDER, LYOPHILIZED, FOR SOLUTION INTRAMUSCULAR; INTRAVENOUS
Status: CANCELLED
Start: 2021-12-14

## 2021-12-14 RX ORDER — ALBUTEROL SULFATE 90 UG/1
1-2 AEROSOL, METERED RESPIRATORY (INHALATION)
Status: CANCELLED
Start: 2021-12-14

## 2021-12-14 RX ORDER — DIPHENHYDRAMINE HYDROCHLORIDE 50 MG/ML
50 INJECTION INTRAMUSCULAR; INTRAVENOUS
Status: CANCELLED
Start: 2021-12-14

## 2021-12-14 RX ORDER — HEPARIN SODIUM,PORCINE 10 UNIT/ML
5 VIAL (ML) INTRAVENOUS
Status: CANCELLED | OUTPATIENT
Start: 2021-12-14

## 2021-12-14 RX ORDER — NALOXONE HYDROCHLORIDE 0.4 MG/ML
0.2 INJECTION, SOLUTION INTRAMUSCULAR; INTRAVENOUS; SUBCUTANEOUS
Status: CANCELLED | OUTPATIENT
Start: 2021-12-14

## 2021-12-24 ENCOUNTER — HOSPITAL ENCOUNTER (EMERGENCY)
Facility: HOSPITAL | Age: 31
Discharge: HOME | End: 2021-12-24
Attending: EMERGENCY MEDICINE | Admitting: EMERGENCY MEDICINE
Payer: COMMERCIAL

## 2021-12-24 VITALS
HEART RATE: 82 BPM | SYSTOLIC BLOOD PRESSURE: 129 MMHG | WEIGHT: 165 LBS | BODY MASS INDEX: 27.49 KG/M2 | DIASTOLIC BLOOD PRESSURE: 76 MMHG | TEMPERATURE: 97.9 F | RESPIRATION RATE: 16 BRPM | OXYGEN SATURATION: 98 % | HEIGHT: 65 IN

## 2021-12-24 DIAGNOSIS — U07.1 INFECTION DUE TO 2019 NOVEL CORONAVIRUS: ICD-10-CM

## 2021-12-24 LAB
FLUAV RNA SPEC QL NAA+PROBE: NEGATIVE
FLUBV RNA RESP QL NAA+PROBE: NEGATIVE
SARS-COV-2 RNA RESP QL NAA+PROBE: POSITIVE

## 2021-12-24 PROCEDURE — 87636 SARSCOV2 & INF A&B AMP PRB: CPT | Performed by: EMERGENCY MEDICINE

## 2021-12-24 PROCEDURE — 99283 EMERGENCY DEPT VISIT LOW MDM: CPT

## 2021-12-24 PROCEDURE — C9803 HOPD COVID-19 SPEC COLLECT: HCPCS

## 2021-12-24 ASSESSMENT — ENCOUNTER SYMPTOMS
COUGH: 1
HEADACHES: 1

## 2021-12-24 ASSESSMENT — MIFFLIN-ST. JEOR: SCORE: 1459.32

## 2021-12-24 NOTE — ED TRIAGE NOTES
Patient here with bilateral ear pain, headache and vomiting. Afebrile at this time. Last Tylenol approx 9pm.

## 2021-12-24 NOTE — ED PROVIDER NOTES
EMERGENCY DEPARTMENT ENCOUNTER     NAME: Tammie Odonnell   AGE: 31 year old female   YOB: 1990   MRN: [id number]   EVALUATION DATE & TIME: 12/24/2021  1:27 AM   PCP: No Ref-Primary, Physician     Chief Complaint   Patient presents with     Otalgia     Headache     Vomiting   :    FINAL IMPRESSION       1. Infection due to 2019 novel coronavirus           ED COURSE & MEDICAL DECISION MAKING      Pertinent Labs & Imaging studies reviewed. (See chart for details)   31 year old female  presents to the Emergency Department for evaluation of 2 days of URI symptoms including cough, sore throat, ear congestion and fullness. Initial Vitals Reviewed. Initial exam notable for generally well-appearing patient with normal vitals.  She had subjective fever at home and last Tylenol was 5 hours prior to arrival, currently afebrile.  Differential includes URI, influenza, COVID-19.  She is not vaccinated for Covid.  Screening in the ED is positive for COVID-19.  We discussed supportive care measures, home isolation, household exposure isolation and testing, and return precautions, and she will now be discharged in stable condition.        2:01 AM I met with the patient to gather history and to perform my initial exam. I discussed the plan for care while in the Emergency Department. PPE (gloves, eye protection, N95 mask, and surgical mask) was worn by me during patient encounters while patient wore mask.   2:13 AM We discussed plans for discharge and patient is agreeable.       At the conclusion of the encounter I discussed the results of all of the tests and the disposition. The questions were answered. The patient or family acknowledged understanding and was agreeable with the care plan.         MEDICATIONS GIVEN IN THE EMERGENCY:   Medications - No data to display   NEW PRESCRIPTIONS STARTED AT TODAY'S ER VISIT   New Prescriptions    No medications on file      ================================================================   HISTORY OF PRESENT ILLNESS       Patient information was obtained from: Patient   Use of : Yes (phone) - Language: Josue Odonnell is a 31 year old female with history of iron deficiency anemia who presents by walk in for evaluation of otalgia. Patient began to feel unwell yesterday and notes her main concern is ear fullness and popping. Associated cough and headache. Patient is not vaccinated for COVID. No additional medical concerns or complaints at this time.       ================================================================    REVIEW OF SYSTEMS       Review of Systems   HENT: Positive for ear pain (fullness and popping).    Respiratory: Positive for cough.    Neurological: Positive for headaches.   All other systems reviewed and are negative.        PAST HISTORY     PAST MEDICAL HISTORY:   History reviewed. No pertinent past medical history.   PAST SURGICAL HISTORY:   Past Surgical History:   Procedure Laterality Date     C/SECTION, LOW TRANSVERSE        SECTION N/A 2021    Procedure: REPEAT  SECTION;  Surgeon: Alan Olson MD;  Location: [facility] OR      CURRENT MEDICATIONS:   ferrous sulfate 325 (65 FE) MG tablet  ibuprofen (ADVIL/MOTRIN) 200 MG tablet  oxyCODONE (ROXICODONE) 5 MG tablet  prenatal vit no.130-iron-folic (PRENATAL VITAMIN) 27 mg iron- 800 mcg Tab tablet      ALLERGIES:   No Known Allergies   FAMILY HISTORY:   Family History   Problem Relation Age of Onset     No Known Problems Mother      No Known Problems Father      No Known Problems Maternal Grandmother      No Known Problems Maternal Grandfather      No Known Problems Paternal Grandmother      No Known Problems Paternal Grandfather      No Known Problems Brother      No Known Problems Sister      No Known Problems Son      No Known Problems Daughter      No Known Problems Maternal Half-Brother      No Known  Problems Maternal Half-Sister      No Known Problems Paternal Half-Brother      No Known Problems Paternal Half-Sister      No Known Problems Niece      No Known Problems Nephew      No Known Problems Cousin      No Known Problems Other      Diabetes No family hx of      Coronary Artery Disease No family hx of      Hypertension No family hx of      Hyperlipidemia No family hx of      Cerebrovascular Disease No family hx of      Breast Cancer No family hx of      Colon Cancer No family hx of      Prostate Cancer No family hx of      Depression No family hx of      Anxiety Disorder No family hx of      Mental Illness No family hx of      Substance Abuse No family hx of      Anesthesia Reaction No family hx of      Asthma No family hx of      Osteoporosis No family hx of      Genetic Disorder No family hx of      Thyroid Disease No family hx of      Obesity No family hx of      Unknown/Adopted No family hx of       SOCIAL HISTORY:   Social History     Socioeconomic History     Marital status:      Spouse name: Not on file     Number of children: Not on file     Years of education: Not on file     Highest education level: Not on file   Occupational History     Not on file   Tobacco Use     Smoking status: Never Smoker     Smokeless tobacco: Never Used   Substance and Sexual Activity     Alcohol use: Never     Drug use: Never     Sexual activity: Yes     Partners: Male   Other Topics Concern     Parent/sibling w/ CABG, MI or angioplasty before 65F 55M? Not Asked   Social History Narrative     Not on file     Social Determinants of Health     Financial Resource Strain: Not on file   Food Insecurity: Not on file   Transportation Needs: Not on file   Physical Activity: Not on file   Stress: Not on file   Social Connections: Not on file   Intimate Partner Violence: Not on file   Housing Stability: Not on file        VITALS  Patient Vitals for the past 24 hrs:   BP Temp Temp src Pulse Resp SpO2 Height Weight   12/24/21  "0122 129/76 97.9  F (36.6  C) Temporal 82 16 98 % 1.651 m (5' 5\") 74.8 kg (165 lb)        ================================================================    PHYSICAL EXAM     VITAL SIGNS: /76   Pulse 82   Temp 97.9  F (36.6  C) (Temporal)   Resp 16   Ht 1.651 m (5' 5\")   Wt 74.8 kg (165 lb)   LMP 02/07/2021   SpO2 98%   BMI 27.46 kg/m     Constitutional:  Awake, no acute distress   HENT:  Atraumatic, oropharynx without exudate or erythema, membranes moist. Nasal congestion.  Lymph:  No adenopathy  Eyes: EOM intact, PERRL, no injection  Neck: Supple  Respiratory:  Clear to auscultation bilaterally, no wheezes or crackles   Cardiovascular:  Regular rate and rhythm, single S1 and S2   GI:  Soft, nontender, nondistended, no rebound or guarding   Musculoskeletal:  Moves all extremities, no lower extremity edema, no deformities    Skin:  Warm, dry  Neurologic:  Alert and oriented x3, no focal deficits noted       ================================================================  LAB       All pertinent labs reviewed and interpreted.   Labs Ordered and Resulted from Time of ED Arrival to Time of ED Departure   INFLUENZA A/B & SARS-COV2 PCR MULTIPLEX - Abnormal       Result Value    Influenza A PCR Negative      Influenza B PCR Negative      SARS CoV2 PCR Positive (*)         ===============================================================  RADIOLOGY       Reviewed all pertinent imaging. Please see official radiology report.   No orders to display         ================================================================  EKG         I have independently reviewed and interpreted the EKG(s) documented above.     ================================================================  PROCEDURES         I, Jael Allen, am serving as a scribe to document services personally performed by Dr. Vazquez based on my observation and the provider's statements to me. I, Shania Vazquez MD attest that Jael Allen is acting in a " wei bernal, has observed my performance of the services and has documented them in accordance with my direction.   Shania Vazquez M.D.   Emergency Medicine   [facility] EMERGENCY DEPARTMENT  [address] [place] [id number] [phone]  Dept: [phone]      Shania Vazquez MD  12/24/21 0217

## 2021-12-24 NOTE — DISCHARGE INSTRUCTIONS
Your Covid test today in the ER was positive.  It is now important that you isolate at home and do not leave your house or exposure self to any other people until it has been at least 10 days since your symptoms started AND you have been feeling improved for at least 3 days.  This means if you do not start to feel better until day 9, you can come out of isolation until day 12.    It is also very important that the rest of your household isolates especially when this includes unvaccinated individuals whether small children or adults.  They should have a Covid test in 5 to 7 days if they do not have any symptoms.  If they get sick, get them tested sooner.  If they remain negative, they will have to isolate at home and cannot leave the house or be exposed to any other people for 24 days.    Ibuprofen and Tylenol are safe for fever or aches at home.  Sudafed, which is an over-the-counter medicine, can help with congestion and that feeling of ear fullness.  Get lots of rest and drink fluids.  It can be helpful to get a pulse oximeter and follow your oxygen levels at home.  If you are feeling significantly short of breath or your oxygen levels dropped into the 80s, these would be a reason to come back to the hospital.      Even after Covid infection, you are eligible for vaccination to help prevent reinfection.  You need to wait 2 weeks after illness before getting your first vaccine.

## 2021-12-26 ENCOUNTER — OFFICE VISIT (OUTPATIENT)
Dept: FAMILY MEDICINE | Facility: CLINIC | Age: 31
End: 2021-12-26
Payer: COMMERCIAL

## 2021-12-26 VITALS
OXYGEN SATURATION: 98 % | DIASTOLIC BLOOD PRESSURE: 53 MMHG | HEART RATE: 78 BPM | TEMPERATURE: 98.7 F | SYSTOLIC BLOOD PRESSURE: 83 MMHG

## 2021-12-26 DIAGNOSIS — Z20.822 EXPOSURE TO 2019 NOVEL CORONAVIRUS: ICD-10-CM

## 2021-12-26 DIAGNOSIS — U07.1 INFECTION DUE TO 2019 NOVEL CORONAVIRUS: Primary | ICD-10-CM

## 2021-12-26 PROCEDURE — 99213 OFFICE O/P EST LOW 20 MIN: CPT

## 2021-12-26 RX ORDER — NAPROXEN 500 MG/1
TABLET ORAL
COMMUNITY
Start: 2021-12-07 | End: 2022-05-24

## 2021-12-26 RX ORDER — ACETAMINOPHEN 500 MG
500-1000 TABLET ORAL EVERY 6 HOURS PRN
Qty: 60 TABLET | Refills: 0 | Status: SHIPPED | OUTPATIENT
Start: 2021-12-26 | End: 2022-05-24

## 2021-12-26 RX ORDER — NAPROXEN 500 MG/1
500 TABLET ORAL 2 TIMES DAILY WITH MEALS
Qty: 30 TABLET | Refills: 0 | Status: SHIPPED | OUTPATIENT
Start: 2021-12-26 | End: 2022-05-24

## 2021-12-26 NOTE — PROGRESS NOTES
Patient states she is on a pain pill, but unsure of what it is called. Was not able to find one on her chart.     Dave Swanson, Medical Assistant

## 2021-12-26 NOTE — PROGRESS NOTES
SUBJECTIVE:  Tammie Odonnell is an 31 year old female who presents for    Right ear pain:  - 1 day has been worsening  - Has taken medication for pain which helped   - Known covid positive since 12/24  - No change in hearing      PMH:   has no past medical history on file.  Patient Active Problem List   Diagnosis     Pregnancy test positive     Overweight (BMI 25.0-29.9)     Iron deficiency anemia due to chronic blood loss     Pregnancy     Iron deficiency     Social History     Socioeconomic History     Marital status:      Spouse name: Not on file     Number of children: Not on file     Years of education: Not on file     Highest education level: Not on file   Occupational History     Not on file   Tobacco Use     Smoking status: Never Smoker     Smokeless tobacco: Never Used   Substance and Sexual Activity     Alcohol use: Never     Drug use: Never     Sexual activity: Yes     Partners: Male   Other Topics Concern     Parent/sibling w/ CABG, MI or angioplasty before 65F 55M? Not Asked   Social History Narrative     Not on file     Social Determinants of Health     Financial Resource Strain: Not on file   Food Insecurity: Not on file   Transportation Needs: Not on file   Physical Activity: Not on file   Stress: Not on file   Social Connections: Not on file   Intimate Partner Violence: Not on file   Housing Stability: Not on file     Family History   Problem Relation Age of Onset     No Known Problems Mother      No Known Problems Father      No Known Problems Maternal Grandmother      No Known Problems Maternal Grandfather      No Known Problems Paternal Grandmother      No Known Problems Paternal Grandfather      No Known Problems Brother      No Known Problems Sister      No Known Problems Son      No Known Problems Daughter      No Known Problems Maternal Half-Brother      No Known Problems Maternal Half-Sister      No Known Problems Paternal Half-Brother      No Known Problems Paternal Half-Sister      No  Known Problems Niece      No Known Problems Nephew      No Known Problems Cousin      No Known Problems Other      Diabetes No family hx of      Coronary Artery Disease No family hx of      Hypertension No family hx of      Hyperlipidemia No family hx of      Cerebrovascular Disease No family hx of      Breast Cancer No family hx of      Colon Cancer No family hx of      Prostate Cancer No family hx of      Depression No family hx of      Anxiety Disorder No family hx of      Mental Illness No family hx of      Substance Abuse No family hx of      Anesthesia Reaction No family hx of      Asthma No family hx of      Osteoporosis No family hx of      Genetic Disorder No family hx of      Thyroid Disease No family hx of      Obesity No family hx of      Unknown/Adopted No family hx of        ALLERGIES:  Patient has no known allergies.    Current Outpatient Medications   Medication     naproxen (NAPROSYN) 500 MG tablet     ferrous sulfate 325 (65 FE) MG tablet     ibuprofen (ADVIL/MOTRIN) 200 MG tablet     oxyCODONE (ROXICODONE) 5 MG tablet     prenatal vit no.130-iron-folic (PRENATAL VITAMIN) 27 mg iron- 800 mcg Tab tablet     No current facility-administered medications for this visit.         ROS:  ROS is done and is negative for general/constitutional, eye, ENT, Respiratory, cardiovascular, GI, , Skin, musculoskeletal except as noted elsewhere.  All other review of systems negative except as noted elsewhere.    OBJECTIVE:  BP (!) 83/53 (BP Location: Right arm, Patient Position: Chair, Cuff Size: Adult Regular)   Pulse 78   Temp 98.7  F (37.1  C) (Tympanic)   LMP 02/07/2021   SpO2 98%   GENERAL APPEARANCE: Alert, in no acute distress.  EYES: Conjunctivae clear.  EARS: External ears normal. Right Canals some wax. Right TM bulging   NOSE: Normal, no drainage.  NECK: Supple, symmetrical, no adenopathy  RESP: Clear to auscultation bilaterally, no wheezing or retractions no increased effort.  CV: Regular rate and  rhythm, no murmurs, good capillary refill.  SKIN: No ulcers, lesions or rash.  MUSCULOSKELETAL: No gross deformities.      RESULTS  No results found for any visits on 12/26/21.  No results found for this or any previous visit (from the past 48 hour(s)).    ASSESSMENT/PLAN:    Right Ear Pain:  States has been worsening for 1 day, although presented to emergency room a couple days ago for similar symptoms and turned out to be Covid positive at that time.  Is afebrile here and reports taking a pain medication which has been improving it a little bit.  Right TM is bulging a little bit, and there is mild amount of earwax in the canal, but is not significantly erythematous.  She also does not have significant TTP of her right ear externally or internally.  Given the total days of symptoms, does not meet antibiotic requirements, and she does have known COVID-19 which further decreases need for antibiotics currently.  - Tylenol/Naproxen   - Continue to quarantine and self isolate  -Precautions and guidance given.      PPE worn: Yes    Options for treatment and follow-up care were reviewed with the patient and/or guardian. Tammie A Belle and/or guardian engaged in the decision making process and verbalized understanding of the options discussed and agreed with the final plan.    See [facility] for orders, medications, letters, patient instructions    Alan Nieves DO, MBA

## 2022-01-20 ENCOUNTER — MEDICAL CORRESPONDENCE (OUTPATIENT)
Dept: HEALTH INFORMATION MANAGEMENT | Facility: CLINIC | Age: 32
End: 2022-01-20
Payer: COMMERCIAL

## 2022-01-20 DIAGNOSIS — E61.1 IRON DEFICIENCY: ICD-10-CM

## 2022-01-20 DIAGNOSIS — D50.9 IRON DEFICIENCY ANEMIA, UNSPECIFIED: Primary | ICD-10-CM

## 2022-01-20 RX ORDER — HEPARIN SODIUM (PORCINE) LOCK FLUSH IV SOLN 100 UNIT/ML 100 UNIT/ML
5 SOLUTION INTRAVENOUS
Status: CANCELLED | OUTPATIENT
Start: 2022-01-20

## 2022-01-20 RX ORDER — EPINEPHRINE 1 MG/ML
0.3 INJECTION, SOLUTION, CONCENTRATE INTRAVENOUS EVERY 5 MIN PRN
Status: CANCELLED | OUTPATIENT
Start: 2022-01-20

## 2022-01-20 RX ORDER — DIPHENHYDRAMINE HYDROCHLORIDE 50 MG/ML
50 INJECTION INTRAMUSCULAR; INTRAVENOUS
Status: CANCELLED
Start: 2022-01-20

## 2022-01-20 RX ORDER — HEPARIN SODIUM,PORCINE 10 UNIT/ML
5 VIAL (ML) INTRAVENOUS
Status: CANCELLED | OUTPATIENT
Start: 2022-01-20

## 2022-01-20 RX ORDER — METHYLPREDNISOLONE SODIUM SUCCINATE 125 MG/2ML
125 INJECTION, POWDER, LYOPHILIZED, FOR SOLUTION INTRAMUSCULAR; INTRAVENOUS
Status: CANCELLED
Start: 2022-01-20

## 2022-01-20 RX ORDER — MEPERIDINE HYDROCHLORIDE 25 MG/ML
25 INJECTION INTRAMUSCULAR; INTRAVENOUS; SUBCUTANEOUS EVERY 30 MIN PRN
Status: CANCELLED | OUTPATIENT
Start: 2022-01-20

## 2022-01-20 RX ORDER — ALBUTEROL SULFATE 0.83 MG/ML
2.5 SOLUTION RESPIRATORY (INHALATION)
Status: CANCELLED | OUTPATIENT
Start: 2022-01-20

## 2022-01-20 RX ORDER — NALOXONE HYDROCHLORIDE 0.4 MG/ML
0.2 INJECTION, SOLUTION INTRAMUSCULAR; INTRAVENOUS; SUBCUTANEOUS
Status: CANCELLED | OUTPATIENT
Start: 2022-01-20

## 2022-01-20 RX ORDER — ALBUTEROL SULFATE 90 UG/1
1-2 AEROSOL, METERED RESPIRATORY (INHALATION)
Status: CANCELLED
Start: 2022-01-20

## 2022-01-24 ENCOUNTER — INFUSION THERAPY VISIT (OUTPATIENT)
Dept: INFUSION THERAPY | Facility: HOSPITAL | Age: 32
End: 2022-01-24
Attending: PHYSICIAN ASSISTANT
Payer: MEDICAID

## 2022-01-24 VITALS
DIASTOLIC BLOOD PRESSURE: 62 MMHG | SYSTOLIC BLOOD PRESSURE: 115 MMHG | RESPIRATION RATE: 16 BRPM | OXYGEN SATURATION: 97 % | HEART RATE: 75 BPM | TEMPERATURE: 98.4 F

## 2022-01-24 DIAGNOSIS — E61.1 IRON DEFICIENCY: Primary | ICD-10-CM

## 2022-01-24 PROCEDURE — 258N000003 HC RX IP 258 OP 636: Performed by: PHYSICIAN ASSISTANT

## 2022-01-24 PROCEDURE — 96366 THER/PROPH/DIAG IV INF ADDON: CPT

## 2022-01-24 PROCEDURE — 250N000011 HC RX IP 250 OP 636: Performed by: PHYSICIAN ASSISTANT

## 2022-01-24 PROCEDURE — 96365 THER/PROPH/DIAG IV INF INIT: CPT

## 2022-01-24 RX ORDER — EPINEPHRINE 1 MG/ML
0.3 INJECTION, SOLUTION INTRAMUSCULAR; SUBCUTANEOUS EVERY 5 MIN PRN
Status: CANCELLED | OUTPATIENT
Start: 2022-01-25

## 2022-01-24 RX ORDER — MEPERIDINE HYDROCHLORIDE 25 MG/ML
25 INJECTION INTRAMUSCULAR; INTRAVENOUS; SUBCUTANEOUS EVERY 30 MIN PRN
Status: DISCONTINUED | OUTPATIENT
Start: 2022-01-24 | End: 2022-01-24 | Stop reason: HOSPADM

## 2022-01-24 RX ORDER — HEPARIN SODIUM,PORCINE 10 UNIT/ML
5 VIAL (ML) INTRAVENOUS
Status: CANCELLED | OUTPATIENT
Start: 2022-01-25

## 2022-01-24 RX ORDER — ALBUTEROL SULFATE 0.83 MG/ML
2.5 SOLUTION RESPIRATORY (INHALATION)
Status: CANCELLED | OUTPATIENT
Start: 2022-01-25

## 2022-01-24 RX ORDER — EPINEPHRINE 1 MG/ML
0.3 INJECTION, SOLUTION INTRAMUSCULAR; SUBCUTANEOUS EVERY 5 MIN PRN
Status: DISCONTINUED | OUTPATIENT
Start: 2022-01-24 | End: 2022-01-24 | Stop reason: HOSPADM

## 2022-01-24 RX ORDER — HEPARIN SODIUM (PORCINE) LOCK FLUSH IV SOLN 100 UNIT/ML 100 UNIT/ML
5 SOLUTION INTRAVENOUS
Status: CANCELLED | OUTPATIENT
Start: 2022-01-25

## 2022-01-24 RX ORDER — ALBUTEROL SULFATE 0.83 MG/ML
2.5 SOLUTION RESPIRATORY (INHALATION)
Status: DISCONTINUED | OUTPATIENT
Start: 2022-01-24 | End: 2022-01-24 | Stop reason: HOSPADM

## 2022-01-24 RX ORDER — METHYLPREDNISOLONE SODIUM SUCCINATE 125 MG/2ML
125 INJECTION, POWDER, LYOPHILIZED, FOR SOLUTION INTRAMUSCULAR; INTRAVENOUS
Status: CANCELLED
Start: 2022-01-25

## 2022-01-24 RX ORDER — DIPHENHYDRAMINE HYDROCHLORIDE 50 MG/ML
50 INJECTION INTRAMUSCULAR; INTRAVENOUS
Status: DISCONTINUED | OUTPATIENT
Start: 2022-01-24 | End: 2022-01-24 | Stop reason: HOSPADM

## 2022-01-24 RX ORDER — METHYLPREDNISOLONE SODIUM SUCCINATE 125 MG/2ML
125 INJECTION, POWDER, LYOPHILIZED, FOR SOLUTION INTRAMUSCULAR; INTRAVENOUS
Status: DISCONTINUED | OUTPATIENT
Start: 2022-01-24 | End: 2022-01-24 | Stop reason: HOSPADM

## 2022-01-24 RX ORDER — DIPHENHYDRAMINE HYDROCHLORIDE 50 MG/ML
50 INJECTION INTRAMUSCULAR; INTRAVENOUS
Status: CANCELLED
Start: 2022-01-25

## 2022-01-24 RX ORDER — MEPERIDINE HYDROCHLORIDE 25 MG/ML
25 INJECTION INTRAMUSCULAR; INTRAVENOUS; SUBCUTANEOUS EVERY 30 MIN PRN
Status: CANCELLED | OUTPATIENT
Start: 2022-01-25

## 2022-01-24 RX ORDER — NALOXONE HYDROCHLORIDE 0.4 MG/ML
0.2 INJECTION, SOLUTION INTRAMUSCULAR; INTRAVENOUS; SUBCUTANEOUS
Status: CANCELLED | OUTPATIENT
Start: 2022-01-25

## 2022-01-24 RX ORDER — NALOXONE HYDROCHLORIDE 0.4 MG/ML
0.2 INJECTION, SOLUTION INTRAMUSCULAR; INTRAVENOUS; SUBCUTANEOUS
Status: DISCONTINUED | OUTPATIENT
Start: 2022-01-24 | End: 2022-01-24 | Stop reason: HOSPADM

## 2022-01-24 RX ORDER — ALBUTEROL SULFATE 90 UG/1
1-2 AEROSOL, METERED RESPIRATORY (INHALATION)
Status: CANCELLED
Start: 2022-01-25

## 2022-01-24 RX ORDER — ALBUTEROL SULFATE 90 UG/1
1-2 AEROSOL, METERED RESPIRATORY (INHALATION)
Status: DISCONTINUED | OUTPATIENT
Start: 2022-01-24 | End: 2022-01-24 | Stop reason: HOSPADM

## 2022-01-24 RX ADMIN — IRON SUCROSE 300 MG: 20 INJECTION, SOLUTION INTRAVENOUS at 11:02

## 2022-01-24 NOTE — PROGRESS NOTES
Infusion Nursing Note:  Tammie Odonnell presents today for Venofer 300mg dose 1/3.    Patient seen by provider today: No   present during visit today: Yes, Language: Swedish.     Note: Tammie comes to infusion today ambulatory and in stable condition. Confirms that she is here for an Iron infusion.Medication discussed in detail with Tammie. All questions answered. Peripheral IV started in the left AC. Venofer given over 1.5 hours. The IV line was then flushed with NS over 30 minutes. Tolerated infusion without issue. VSS throughout today's appointment. The IV was then removed, covered site with 2x2 gauze and secured with Coban. Reviewed upcoming appointments. Left infusion ambulatory and in stable condition at 1330.     Intravenous Access:  Peripheral IV placed.    Treatment Conditions:  Medication given per order.    Post Infusion Assessment:  Patient tolerated infusion without incident.  Patient observed for 30 minutes post Iron infusion per protocol.  Blood return noted pre and post infusion.  Site patent and intact, free from redness, edema or discomfort.  Access discontinued per protocol.     Discharge Plan:   Discharge instructions reviewed with: Patient.  Patient and/or family verbalized understanding of discharge instructions and all questions answered.  Copy of AVS reviewed with patient and/or family.  Patient will return on 2/7 for next appointment.  Patient discharged in stable condition accompanied by: self.  Departure Mode: Ambulatory.    Mandy Kc RN

## 2022-02-07 ENCOUNTER — INFUSION THERAPY VISIT (OUTPATIENT)
Dept: INFUSION THERAPY | Facility: HOSPITAL | Age: 32
End: 2022-02-07
Attending: PHYSICIAN ASSISTANT
Payer: COMMERCIAL

## 2022-02-07 VITALS
SYSTOLIC BLOOD PRESSURE: 110 MMHG | OXYGEN SATURATION: 98 % | HEART RATE: 72 BPM | RESPIRATION RATE: 16 BRPM | DIASTOLIC BLOOD PRESSURE: 59 MMHG | TEMPERATURE: 97.9 F

## 2022-02-07 DIAGNOSIS — E61.1 IRON DEFICIENCY: Primary | ICD-10-CM

## 2022-02-07 DIAGNOSIS — D50.8 IRON DEFICIENCY ANEMIA SECONDARY TO INADEQUATE DIETARY IRON INTAKE: ICD-10-CM

## 2022-02-07 PROCEDURE — 96366 THER/PROPH/DIAG IV INF ADDON: CPT

## 2022-02-07 PROCEDURE — 250N000011 HC RX IP 250 OP 636: Performed by: PHYSICIAN ASSISTANT

## 2022-02-07 PROCEDURE — 258N000003 HC RX IP 258 OP 636: Performed by: PHYSICIAN ASSISTANT

## 2022-02-07 PROCEDURE — 96365 THER/PROPH/DIAG IV INF INIT: CPT

## 2022-02-07 RX ORDER — ALBUTEROL SULFATE 90 UG/1
1-2 AEROSOL, METERED RESPIRATORY (INHALATION)
Status: DISCONTINUED | OUTPATIENT
Start: 2022-02-07 | End: 2022-02-07 | Stop reason: HOSPADM

## 2022-02-07 RX ORDER — ALBUTEROL SULFATE 0.83 MG/ML
2.5 SOLUTION RESPIRATORY (INHALATION)
Status: CANCELLED | OUTPATIENT
Start: 2022-02-09

## 2022-02-07 RX ORDER — NALOXONE HYDROCHLORIDE 0.4 MG/ML
0.2 INJECTION, SOLUTION INTRAMUSCULAR; INTRAVENOUS; SUBCUTANEOUS
Status: CANCELLED | OUTPATIENT
Start: 2022-02-09

## 2022-02-07 RX ORDER — HEPARIN SODIUM,PORCINE 10 UNIT/ML
5 VIAL (ML) INTRAVENOUS
Status: DISCONTINUED | OUTPATIENT
Start: 2022-02-07 | End: 2022-02-07 | Stop reason: HOSPADM

## 2022-02-07 RX ORDER — MEPERIDINE HYDROCHLORIDE 25 MG/ML
25 INJECTION INTRAMUSCULAR; INTRAVENOUS; SUBCUTANEOUS EVERY 30 MIN PRN
Status: CANCELLED | OUTPATIENT
Start: 2022-02-09

## 2022-02-07 RX ORDER — HEPARIN SODIUM (PORCINE) LOCK FLUSH IV SOLN 100 UNIT/ML 100 UNIT/ML
5 SOLUTION INTRAVENOUS
Status: DISCONTINUED | OUTPATIENT
Start: 2022-02-07 | End: 2022-02-07 | Stop reason: HOSPADM

## 2022-02-07 RX ORDER — METHYLPREDNISOLONE SODIUM SUCCINATE 125 MG/2ML
125 INJECTION, POWDER, LYOPHILIZED, FOR SOLUTION INTRAMUSCULAR; INTRAVENOUS
Status: CANCELLED
Start: 2022-02-09

## 2022-02-07 RX ORDER — NALOXONE HYDROCHLORIDE 0.4 MG/ML
0.2 INJECTION, SOLUTION INTRAMUSCULAR; INTRAVENOUS; SUBCUTANEOUS
Status: DISCONTINUED | OUTPATIENT
Start: 2022-02-07 | End: 2022-02-07 | Stop reason: HOSPADM

## 2022-02-07 RX ORDER — HEPARIN SODIUM,PORCINE 10 UNIT/ML
5 VIAL (ML) INTRAVENOUS
Status: CANCELLED | OUTPATIENT
Start: 2022-02-09

## 2022-02-07 RX ORDER — EPINEPHRINE 1 MG/ML
0.3 INJECTION, SOLUTION INTRAMUSCULAR; SUBCUTANEOUS EVERY 5 MIN PRN
Status: DISCONTINUED | OUTPATIENT
Start: 2022-02-07 | End: 2022-02-07 | Stop reason: HOSPADM

## 2022-02-07 RX ORDER — DIPHENHYDRAMINE HYDROCHLORIDE 50 MG/ML
50 INJECTION INTRAMUSCULAR; INTRAVENOUS
Status: DISCONTINUED | OUTPATIENT
Start: 2022-02-07 | End: 2022-02-07 | Stop reason: HOSPADM

## 2022-02-07 RX ORDER — EPINEPHRINE 1 MG/ML
0.3 INJECTION, SOLUTION INTRAMUSCULAR; SUBCUTANEOUS EVERY 5 MIN PRN
Status: CANCELLED | OUTPATIENT
Start: 2022-02-09

## 2022-02-07 RX ORDER — MEPERIDINE HYDROCHLORIDE 25 MG/ML
25 INJECTION INTRAMUSCULAR; INTRAVENOUS; SUBCUTANEOUS EVERY 30 MIN PRN
Status: DISCONTINUED | OUTPATIENT
Start: 2022-02-07 | End: 2022-02-07 | Stop reason: HOSPADM

## 2022-02-07 RX ORDER — ALBUTEROL SULFATE 0.83 MG/ML
2.5 SOLUTION RESPIRATORY (INHALATION)
Status: DISCONTINUED | OUTPATIENT
Start: 2022-02-07 | End: 2022-02-07 | Stop reason: HOSPADM

## 2022-02-07 RX ORDER — ALBUTEROL SULFATE 90 UG/1
1-2 AEROSOL, METERED RESPIRATORY (INHALATION)
Status: CANCELLED
Start: 2022-02-09

## 2022-02-07 RX ORDER — DIPHENHYDRAMINE HYDROCHLORIDE 50 MG/ML
50 INJECTION INTRAMUSCULAR; INTRAVENOUS
Status: CANCELLED
Start: 2022-02-09

## 2022-02-07 RX ORDER — METHYLPREDNISOLONE SODIUM SUCCINATE 125 MG/2ML
125 INJECTION, POWDER, LYOPHILIZED, FOR SOLUTION INTRAMUSCULAR; INTRAVENOUS
Status: DISCONTINUED | OUTPATIENT
Start: 2022-02-07 | End: 2022-02-07 | Stop reason: HOSPADM

## 2022-02-07 RX ORDER — HEPARIN SODIUM (PORCINE) LOCK FLUSH IV SOLN 100 UNIT/ML 100 UNIT/ML
5 SOLUTION INTRAVENOUS
Status: CANCELLED | OUTPATIENT
Start: 2022-02-09

## 2022-02-07 RX ADMIN — IRON SUCROSE 300 MG: 20 INJECTION, SOLUTION INTRAVENOUS at 11:08

## 2022-02-07 RX ADMIN — SODIUM CHLORIDE 250 ML: 9 INJECTION, SOLUTION INTRAVENOUS at 11:00

## 2022-02-07 NOTE — PROGRESS NOTES
Infusion Nursing Note:  Tammie Odonnell presents today for venofer 300mg, dose #2.    Patient seen by provider today: No   present during visit today: Yes, Language: [place]ian.     Note: Tammie arrives A&Ox4 ambulatory and stable.,confirms she is here for iron infuson.  Pt states she tolerated initial dose ok. Continues to have dizziness when she is up walking. POC/medication education reviewed, pt stated understanding and agreed to plan    Intravenous Access:  Peripheral IV placed.    Treatment Conditions:  Dx MARK.      Post Infusion Assessment:  Patient tolerated infusion without incident.  Blood return noted pre and post infusion.  Site patent and intact, free from redness, edema or discomfort.  Access discontinued per protocol.       Discharge Plan:   Discharge instructions reviewed with: Patient.  Copy of AVS reviewed with patient and/or family.  Patient will return 2/21/22 for next appointment.  Patient discharged in stable condition accompanied by: self.  Departure Mode: Ambulatory and stable at 1320.      Jackie Montgomery RN                    1320

## 2022-02-07 NOTE — PATIENT INSTRUCTIONS
Patient Education     Venofer Injection 20 mg/mL  Uses  For anemia.  Instructions  This is an IV medicine. It is given through a sterile tube directly into the vein by a healthcare provider.  This medicine is given gradually through the IV line.  Read and make sure you understand the instructions for measuring your dose and using the syringe before using this medicine.  Always inspect the medicine before using.  The liquid should be clear and dark brown.  Do not use the medicine if it contains any particles or if it has changed color.  Keep this medicine at room temperature.  Protect medicine from light.  Speak with your nurse or pharmacist about how long the medicine can be stored safely at room temperature or in the refrigerator before it needs to be discarded.  Never use any medicine that has .  Discard any remaining medicine after your dose is given.  If you miss a dose, contact your doctor for instructions.  Please tell your doctor and pharmacist about all the medicines you take. Include both prescription and over-the-counter medicines. Also tell them about any vitamins, herbal medicines, or anything else you take for your health.  It is very important that you follow your doctor's instructions for all blood tests.  Cautions  Tell your doctor and pharmacist if you ever had an allergic reaction to a medicine. Symptoms of an allergic reaction can include trouble breathing, skin rash, itching, swelling, or severe dizziness.  Do not use the medication any more than instructed.  This medicine may cause dizziness or fainting, especially after exercising or in hot weather. Be very careful when standing or sitting up quickly.  Tell the doctor or pharmacist if you are pregnant, planning to be pregnant, or breastfeeding.  Ask your pharmacist if this medicine can interact with any of your other medicines. Be sure to tell them about all the medicines you take.  Please tell all your doctors and dentists that you are  on this medicine before they provide care.  Do not start or stop any other medicines without first speaking to your doctor or pharmacist.  Used needles and syringes should be thrown away properly in a medical waste container. Ask your doctor or pharmacist if you need help.  Do not share this medicine with anyone who has not been prescribed this medicine.  Side Effects  The following is a list of some common side effects from this medicine. Please speak with your doctor about what you should do if you experience these or other side effects.    constipation    diarrhea    dizziness    swelling of the legs, feet, and hands    muscle cramps    nausea    changes in taste or unpleasant taste    vomiting  Call your doctor or get medical help right away if you notice any of these more serious side effects:    chest pain    fainting    severe or persistent headache    fast or irregular heart beats    severe stomach or bowel pain    blurring or changes of vision  A few people may have an allergic reactions to this medicine. Symptoms can include difficulty breathing, skin rash, itching, swelling, or severe dizziness. If you notice any of these symptoms, seek medical help quickly.  Extra  Please speak with your doctor, nurse, or pharmacist if you have any questions about this medicine.  [email or web address]  IMPORTANT NOTE: This document tells you briefly how to take your medicine, but it does not tell you all there is to know about it.Your doctor or pharmacist may give you other documents about your medicine. Please talk to them if you have any questions.Always follow their advice. There is a more complete description of this medicine available in English.Scan this code on your smartphone or tablet or use the web address below. You can also ask your pharmacist for a printout. If you have any questions, please ask your pharmacist.     [facility].

## 2022-02-21 ENCOUNTER — INFUSION THERAPY VISIT (OUTPATIENT)
Dept: INFUSION THERAPY | Facility: HOSPITAL | Age: 32
End: 2022-02-21
Attending: PHYSICIAN ASSISTANT
Payer: COMMERCIAL

## 2022-02-21 VITALS
TEMPERATURE: 97.7 F | OXYGEN SATURATION: 98 % | SYSTOLIC BLOOD PRESSURE: 108 MMHG | HEART RATE: 58 BPM | RESPIRATION RATE: 16 BRPM | DIASTOLIC BLOOD PRESSURE: 67 MMHG

## 2022-02-21 DIAGNOSIS — D50.8 IRON DEFICIENCY ANEMIA SECONDARY TO INADEQUATE DIETARY IRON INTAKE: Primary | ICD-10-CM

## 2022-02-21 DIAGNOSIS — E61.1 IRON DEFICIENCY: ICD-10-CM

## 2022-02-21 PROCEDURE — 96366 THER/PROPH/DIAG IV INF ADDON: CPT

## 2022-02-21 PROCEDURE — 250N000011 HC RX IP 250 OP 636: Performed by: PHYSICIAN ASSISTANT

## 2022-02-21 PROCEDURE — 258N000003 HC RX IP 258 OP 636: Performed by: PHYSICIAN ASSISTANT

## 2022-02-21 PROCEDURE — 96365 THER/PROPH/DIAG IV INF INIT: CPT

## 2022-02-21 RX ORDER — MEPERIDINE HYDROCHLORIDE 25 MG/ML
25 INJECTION INTRAMUSCULAR; INTRAVENOUS; SUBCUTANEOUS EVERY 30 MIN PRN
Status: CANCELLED | OUTPATIENT
Start: 2022-02-23

## 2022-02-21 RX ORDER — NALOXONE HYDROCHLORIDE 0.4 MG/ML
0.2 INJECTION, SOLUTION INTRAMUSCULAR; INTRAVENOUS; SUBCUTANEOUS
Status: CANCELLED | OUTPATIENT
Start: 2022-02-23

## 2022-02-21 RX ORDER — ALBUTEROL SULFATE 90 UG/1
1-2 AEROSOL, METERED RESPIRATORY (INHALATION)
Status: CANCELLED
Start: 2022-02-23

## 2022-02-21 RX ORDER — ALBUTEROL SULFATE 0.83 MG/ML
2.5 SOLUTION RESPIRATORY (INHALATION)
Status: DISCONTINUED | OUTPATIENT
Start: 2022-02-21 | End: 2022-02-21

## 2022-02-21 RX ORDER — MEPERIDINE HYDROCHLORIDE 25 MG/ML
25 INJECTION INTRAMUSCULAR; INTRAVENOUS; SUBCUTANEOUS EVERY 30 MIN PRN
Status: DISCONTINUED | OUTPATIENT
Start: 2022-02-21 | End: 2022-02-21

## 2022-02-21 RX ORDER — HEPARIN SODIUM (PORCINE) LOCK FLUSH IV SOLN 100 UNIT/ML 100 UNIT/ML
5 SOLUTION INTRAVENOUS
Status: CANCELLED | OUTPATIENT
Start: 2022-02-23

## 2022-02-21 RX ORDER — ALBUTEROL SULFATE 90 UG/1
1-2 AEROSOL, METERED RESPIRATORY (INHALATION)
Status: DISCONTINUED | OUTPATIENT
Start: 2022-02-21 | End: 2022-02-21

## 2022-02-21 RX ORDER — METHYLPREDNISOLONE SODIUM SUCCINATE 125 MG/2ML
125 INJECTION, POWDER, LYOPHILIZED, FOR SOLUTION INTRAMUSCULAR; INTRAVENOUS
Status: DISCONTINUED | OUTPATIENT
Start: 2022-02-21 | End: 2022-02-21

## 2022-02-21 RX ORDER — DIPHENHYDRAMINE HYDROCHLORIDE 50 MG/ML
50 INJECTION INTRAMUSCULAR; INTRAVENOUS
Status: CANCELLED
Start: 2022-02-23

## 2022-02-21 RX ORDER — DIPHENHYDRAMINE HYDROCHLORIDE 50 MG/ML
50 INJECTION INTRAMUSCULAR; INTRAVENOUS
Status: DISCONTINUED | OUTPATIENT
Start: 2022-02-21 | End: 2022-02-21

## 2022-02-21 RX ORDER — EPINEPHRINE 1 MG/ML
0.3 INJECTION, SOLUTION INTRAMUSCULAR; SUBCUTANEOUS EVERY 5 MIN PRN
Status: DISCONTINUED | OUTPATIENT
Start: 2022-02-21 | End: 2022-02-21

## 2022-02-21 RX ORDER — HEPARIN SODIUM,PORCINE 10 UNIT/ML
5 VIAL (ML) INTRAVENOUS
Status: CANCELLED | OUTPATIENT
Start: 2022-02-23

## 2022-02-21 RX ORDER — METHYLPREDNISOLONE SODIUM SUCCINATE 125 MG/2ML
125 INJECTION, POWDER, LYOPHILIZED, FOR SOLUTION INTRAMUSCULAR; INTRAVENOUS
Status: CANCELLED
Start: 2022-02-23

## 2022-02-21 RX ORDER — EPINEPHRINE 1 MG/ML
0.3 INJECTION, SOLUTION INTRAMUSCULAR; SUBCUTANEOUS EVERY 5 MIN PRN
Status: CANCELLED | OUTPATIENT
Start: 2022-02-23

## 2022-02-21 RX ORDER — NALOXONE HYDROCHLORIDE 0.4 MG/ML
0.2 INJECTION, SOLUTION INTRAMUSCULAR; INTRAVENOUS; SUBCUTANEOUS
Status: DISCONTINUED | OUTPATIENT
Start: 2022-02-21 | End: 2022-02-21

## 2022-02-21 RX ORDER — ALBUTEROL SULFATE 0.83 MG/ML
2.5 SOLUTION RESPIRATORY (INHALATION)
Status: CANCELLED | OUTPATIENT
Start: 2022-02-23

## 2022-02-21 RX ADMIN — IRON SUCROSE 400 MG: 20 INJECTION, SOLUTION INTRAVENOUS at 10:43

## 2022-02-21 NOTE — PATIENT INSTRUCTIONS
If you have any questions or concerns please contact your doctor. You can also call us here in infusion at [phone]. Option 2.     Patient Education     Patient Education    Iron Sucrose Chewable tablet    Iron Sucrose Solution for injection  Iron Sucrose Solution for injection  What is this medicine?  IRON SUCROSE (BEVERLEY nunezs) is an iron complex. Iron is used to make healthy red blood cells, which carry oxygen and nutrients throughout the body. This medicine is used to treat iron deficiency anemia in people with chronic kidney disease.  This medicine may be used for other purposes; ask your health care provider or pharmacist if you have questions.  What should I tell my health care provider before I take this medicine?  They need to know if you have any of these conditions:    anemia not caused by low iron levels    heart disease    high levels of iron in the blood    kidney disease    liver disease    an unusual or allergic reaction to iron, other medicines, foods, dyes, or preservatives    pregnant or trying to get pregnant    breast-feeding  How should I use this medicine?  This medicine is for infusion into a vein. It is given by a health care professional in a hospital or clinic setting.  Talk to your pediatrician regarding the use of this medicine in children. While this drug may be prescribed for children as young as 2 years for selected conditions, precautions do apply.  Overdosage: If you think you have taken too much of this medicine contact a poison control center or emergency room at once.  NOTE: This medicine is only for you. Do not share this medicine with others.  What if I miss a dose?  It is important not to miss your dose. Call your doctor or health care professional if you are unable to keep an appointment.  What may interact with this medicine?  Do not take this medicine with any of the following medications:    deferoxamine    dimercaprol    other iron products  This medicine may  also interact with the following medications:    chloramphenicol    deferasirox  This list may not describe all possible interactions. Give your health care provider a list of all the medicines, herbs, non-prescription drugs, or dietary supplements you use. Also tell them if you smoke, drink alcohol, or use illegal drugs. Some items may interact with your medicine.  What should I watch for while using this medicine?  Visit your doctor or healthcare professional regularly. Tell your doctor or healthcare professional if your symptoms do not start to get better or if they get worse. You may need blood work done while you are taking this medicine.  You may need to follow a special diet. Talk to your doctor. Foods that contain iron include: whole grains/cereals, dried fruits, beans, or peas, leafy green vegetables, and organ meats (liver, kidney).  What side effects may I notice from receiving this medicine?  Side effects that you should report to your doctor or health care professional as soon as possible:    allergic reactions like skin rash, itching or hives, swelling of the face, lips, or tongue    breathing problems    changes in blood pressure    cough    fast, irregular heartbeat    feeling faint or lightheaded, falls    fever or chills    flushing, sweating, or hot feelings    joint or muscle aches/pains    seizures    swelling of the ankles or feet    unusually weak or tired  Side effects that usually do not require medical attention (report to your doctor or health care professional if they continue or are bothersome):    diarrhea    feeling achy    headache    irritation at site where injected    nausea, vomiting    stomach upset    tiredness  This list may not describe all possible side effects. Call your doctor for medical advice about side effects. You may report side effects to FDA at 4-273-FDA-2757.  Where should I keep my medicine?  This drug is given in a hospital or clinic and will not be stored at  home.  NOTE:This sheet is a summary. It may not cover all possible information. If you have questions about this medicine, talk to your doctor, pharmacist, or health care provider. Copyright  2016 [employer] Standard

## 2022-02-21 NOTE — PROGRESS NOTES
.[facility]fusion Nursing Note:  Tammie Odonnell presents today for Venofer 400 mg.    Patient seen by provider today: No   present during visit today: Yes, Language: Swedish.     Note: Tammie comes to infusion today ambulatory and in stable condition. Confirms that she is here for her last dose of Iron. Has tolerated the previous infusions without issue. Peripheral IV started in the left AC. Venofer given over 2.5 hours. The IV line was then flushed with NS over 30 minutes. Tolerated infusion without issue. VSS throughout today's appointment. The IV was then removed, covered site with 2x2 gauze and secured with Coban. Left infusion ambulatory and in stable condition around 1400.     Intravenous Access:  Peripheral IV placed.    Treatment Conditions:  Medication given per order.    Post Infusion Assessment:  Patient tolerated infusion without incident.  Patient observed for 30 minutes post Iron infusion per protocol.  Blood return noted pre and post infusion.  Site patent and intact, free from redness, edema or discomfort.  Access discontinued per protocol.     Discharge Plan:   Discharge instructions reviewed with: Patient.  Patient and/or family verbalized understanding of discharge instructions and all questions answered.  Copy of AVS reviewed with patient and/or family.   Patient discharged in stable condition accompanied by: self.  Departure Mode: Ambulatory.    Mandy Kc RN

## 2022-05-18 ENCOUNTER — OFFICE VISIT (OUTPATIENT)
Dept: FAMILY MEDICINE | Facility: CLINIC | Age: 32
End: 2022-05-18
Payer: COMMERCIAL

## 2022-05-18 VITALS
DIASTOLIC BLOOD PRESSURE: 76 MMHG | HEART RATE: 81 BPM | BODY MASS INDEX: 25.96 KG/M2 | TEMPERATURE: 98.2 F | OXYGEN SATURATION: 98 % | RESPIRATION RATE: 16 BRPM | WEIGHT: 156 LBS | SYSTOLIC BLOOD PRESSURE: 114 MMHG

## 2022-05-18 DIAGNOSIS — Z53.9 ERRONEOUS ENCOUNTER--DISREGARD: Primary | ICD-10-CM

## 2022-05-18 NOTE — PROGRESS NOTES
Patient presents with:  Illness: X 1 month getting worse, headache, body aches, at night has chills      Clinical Decision Making:    No diagnosis found.    There are no Patient Instructions on file for this visit.    HPI:  Patient left without being seen        Problem List:  2022-01: Iron deficiency anemia, unspecified  2021-12: Iron deficiency  2021-11: Pregnancy  2021-03: Overweight (BMI 25.0-29.9)  2021-03: Iron deficiency anemia due to chronic blood loss  2021-03: Pregnancy test positive      No past medical history on file.    Social History     Tobacco Use     Smoking status: Never Smoker     Smokeless tobacco: Never Used   Substance Use Topics     Alcohol use: Never       Review of Systems    Vitals:    05/18/22 1146   BP: 114/76   Pulse: 81   Resp: 16   Temp: 98.2  F (36.8  C)   TempSrc: Oral   SpO2: 98%   Weight: 70.8 kg (156 lb)       Physical Exam    Results:  No results found for any visits on 05/18/22.      At the end of the encounter, I discussed results, diagnosis, medications. Discussed red flags for immediate return to clinic/ER, as well as indications for follow up if no improvement. Patient understood and agreed to plan. Patient was stable for discharge.  This encounter was opened in error. Please disregard.

## 2022-05-24 ENCOUNTER — OFFICE VISIT (OUTPATIENT)
Dept: INTERNAL MEDICINE | Facility: CLINIC | Age: 32
End: 2022-05-24
Payer: COMMERCIAL

## 2022-05-24 VITALS
HEART RATE: 77 BPM | SYSTOLIC BLOOD PRESSURE: 108 MMHG | WEIGHT: 159.2 LBS | OXYGEN SATURATION: 99 % | DIASTOLIC BLOOD PRESSURE: 70 MMHG | BODY MASS INDEX: 26.49 KG/M2

## 2022-05-24 DIAGNOSIS — R53.82 CHRONIC FATIGUE: Primary | ICD-10-CM

## 2022-05-24 DIAGNOSIS — R42 DIZZINESS: ICD-10-CM

## 2022-05-24 DIAGNOSIS — R68.83 CHILLS (WITHOUT FEVER): ICD-10-CM

## 2022-05-24 DIAGNOSIS — D50.0 IRON DEFICIENCY ANEMIA DUE TO CHRONIC BLOOD LOSS: ICD-10-CM

## 2022-05-24 LAB
ALBUMIN SERPL-MCNC: 4 G/DL (ref 3.5–5)
ALBUMIN UR-MCNC: NEGATIVE MG/DL
ALP SERPL-CCNC: 66 U/L (ref 45–120)
ALT SERPL W P-5'-P-CCNC: 17 U/L (ref 0–45)
ANION GAP SERPL CALCULATED.3IONS-SCNC: 7 MMOL/L (ref 5–18)
APPEARANCE UR: CLEAR
AST SERPL W P-5'-P-CCNC: 20 U/L (ref 0–40)
BACTERIA #/AREA URNS HPF: ABNORMAL /HPF
BILIRUB SERPL-MCNC: 0.8 MG/DL (ref 0–1)
BILIRUB UR QL STRIP: NEGATIVE
BUN SERPL-MCNC: 7 MG/DL (ref 8–22)
CALCIUM SERPL-MCNC: 9.4 MG/DL (ref 8.5–10.5)
CHLORIDE BLD-SCNC: 107 MMOL/L (ref 98–107)
CO2 SERPL-SCNC: 27 MMOL/L (ref 22–31)
COLOR UR AUTO: YELLOW
CREAT SERPL-MCNC: 0.62 MG/DL (ref 0.6–1.1)
ERYTHROCYTE [DISTWIDTH] IN BLOOD BY AUTOMATED COUNT: 13.9 % (ref 10–15)
GFR SERPL CREATININE-BSD FRML MDRD: >90 ML/MIN/1.73M2
GLUCOSE BLD-MCNC: 83 MG/DL (ref 70–125)
GLUCOSE UR STRIP-MCNC: NEGATIVE MG/DL
HCT VFR BLD AUTO: 42.5 % (ref 35–47)
HGB BLD-MCNC: 13.9 G/DL (ref 11.7–15.7)
HGB UR QL STRIP: NEGATIVE
IRON SATN MFR SERPL: 14 % (ref 15–46)
IRON SERPL-MCNC: 49 UG/DL (ref 35–180)
KETONES UR STRIP-MCNC: NEGATIVE MG/DL
LEUKOCYTE ESTERASE UR QL STRIP: ABNORMAL
MCH RBC QN AUTO: 28.5 PG (ref 26.5–33)
MCHC RBC AUTO-ENTMCNC: 32.7 G/DL (ref 31.5–36.5)
MCV RBC AUTO: 87 FL (ref 78–100)
NITRATE UR QL: NEGATIVE
PH UR STRIP: 7 [PH] (ref 5–8)
PLATELET # BLD AUTO: 244 10E3/UL (ref 150–450)
POTASSIUM BLD-SCNC: 3.7 MMOL/L (ref 3.5–5)
PROT SERPL-MCNC: 7.6 G/DL (ref 6–8)
RBC # BLD AUTO: 4.88 10E6/UL (ref 3.8–5.2)
RBC #/AREA URNS AUTO: ABNORMAL /HPF
SODIUM SERPL-SCNC: 141 MMOL/L (ref 136–145)
SP GR UR STRIP: 1.01 (ref 1–1.03)
SQUAMOUS #/AREA URNS AUTO: ABNORMAL /LPF
TIBC SERPL-MCNC: 357 UG/DL (ref 240–430)
TSH SERPL DL<=0.005 MIU/L-ACNC: 2.71 UIU/ML (ref 0.3–5)
UROBILINOGEN UR STRIP-ACNC: 0.2 E.U./DL
WBC # BLD AUTO: 7 10E3/UL (ref 4–11)
WBC #/AREA URNS AUTO: ABNORMAL /HPF

## 2022-05-24 PROCEDURE — 36415 COLL VENOUS BLD VENIPUNCTURE: CPT | Performed by: INTERNAL MEDICINE

## 2022-05-24 PROCEDURE — 99214 OFFICE O/P EST MOD 30 MIN: CPT | Performed by: INTERNAL MEDICINE

## 2022-05-24 PROCEDURE — 81001 URINALYSIS AUTO W/SCOPE: CPT | Performed by: INTERNAL MEDICINE

## 2022-05-24 PROCEDURE — 85027 COMPLETE CBC AUTOMATED: CPT | Performed by: INTERNAL MEDICINE

## 2022-05-24 PROCEDURE — 80053 COMPREHEN METABOLIC PANEL: CPT | Performed by: INTERNAL MEDICINE

## 2022-05-24 PROCEDURE — 84443 ASSAY THYROID STIM HORMONE: CPT | Performed by: INTERNAL MEDICINE

## 2022-05-24 PROCEDURE — 83550 IRON BINDING TEST: CPT | Performed by: INTERNAL MEDICINE

## 2022-05-24 NOTE — PROGRESS NOTES
"   Assessment & Plan     Chronic fatigue with chills and dizziness  32-year-old [place]ian speaking woman here with several new symptoms that recently began.   used during visit.  Even with , history is quite vague and difficult to understand.  She had some chills last night.  No elevated temperature.  Has been feeling more dizzy and more tired.  Extensive review of systems failed to identify any other specific symptoms.  No headache or sore throat.  No sinus congestion or cough.  No chest pain or dyspnea.  No abdominal pain.  No diarrhea.  No dysuria or increased frequency.  No skin rash.  Only other symptom is some left eyelid twitching.  Denies any chance of pregnancy with her last menstrual period 10 days ago.  No home COVID testing completed.  Exam is unremarkable.  Discussed that it is hard to provide her with a diagnosis with the nonspecific nature of her symptoms.  Could have underlying infection and will check CBC along with UA UC.  We will check appropriate metabolic studies.  She does have history of anemia.  - CBC with platelets  - Comprehensive metabolic panel (BMP + Alb, Alk Phos, ALT, AST, Total. Bili, TP)  - UA Macro with Reflex to Micro and Culture - lab collect  - TSH  - CBC with platelets  - Comprehensive metabolic panel (BMP + Alb, Alk Phos, ALT, AST, Total. Bili, TP)  - TSH  - UA Macro with Reflex to Micro and Culture - lab collect  - Urine Microscopic        Iron deficiency anemia due to chronic blood loss  As above, with history of anemia and complaints of fatigue and dizziness, will recheck CBC along with iron studies.  She received iron infusion earlier this year.  - CBC with platelets  - Iron and iron binding capacity  - CBC with platelets  - Iron and iron binding capacity       BMI:   Estimated body mass index is 26.49 kg/m  as calculated from the following:    Height as of 12/24/21: 1.651 m (5' 5\").    Weight as of this encounter: 72.2 kg (159 lb 3.2 oz).        "         Return if symptoms worsen or fail to improve.    Veto Mcgarry MD  [facility]          Subjective       HPI 32-year-old woman here with complaints of fatigue and dizziness with episode of chills.  See assessment plan for details.  See assessment and plan for details of visit    No current outpatient medications on file.     No current facility-administered medications for this visit.        Review of Systems    12 point ROS is negative other than what is described in assessment and plan and above      Objective    Vitals:    05/24/22 0840   BP: 108/70   BP Location: Right arm   Patient Position: Sitting   Cuff Size: Adult Large   Pulse: 77   SpO2: 99%   Weight: 72.2 kg (159 lb 3.2 oz)        Physical Exam  Well-appearing woman who does not look acutely ill  HEENT normal  Neck without lymphadenopathy or thyromegaly  Lungs clear bilaterally no rales or wheezes  Heart regular rate and rhythm without murmur or gallop  Abdomen soft nontender  Neurologically intact

## 2022-05-24 NOTE — LETTER
May 24, 2022      Tammie Odonnell  [address]   [facility] [address]        Dear ,    No concerning findings on any of your blood work.  Anemia has resolved.  Iron studies look okay.    Resulted Orders   CBC with platelets   Result Value Ref Range    WBC Count 7.0 4.0 - 11.0 10e3/uL    RBC Count 4.88 3.80 - 5.20 10e6/uL    Hemoglobin 13.9 11.7 - 15.7 g/dL    Hematocrit 42.5 35.0 - 47.0 %    MCV 87 78 - 100 fL    MCH 28.5 26.5 - 33.0 pg    MCHC 32.7 31.5 - 36.5 g/dL    RDW 13.9 10.0 - 15.0 %    Platelet Count 244 150 - 450 10e3/uL   Comprehensive metabolic panel (BMP + Alb, Alk Phos, ALT, AST, Total. Bili, TP)   Result Value Ref Range    Sodium 141 136 - 145 mmol/L    Potassium 3.7 3.5 - 5.0 mmol/L    Chloride 107 98 - 107 mmol/L    Carbon Dioxide (CO2) 27 22 - 31 mmol/L    Anion Gap 7 5 - 18 mmol/L    Urea Nitrogen 7 (L) 8 - 22 mg/dL    Creatinine 0.62 0.60 - 1.10 mg/dL    Calcium 9.4 8.5 - 10.5 mg/dL    Glucose 83 70 - 125 mg/dL    Alkaline Phosphatase 66 45 - 120 U/L    AST 20 0 - 40 U/L    ALT 17 0 - 45 U/L    Protein Total 7.6 6.0 - 8.0 g/dL    Albumin 4.0 3.5 - 5.0 g/dL    Bilirubin Total 0.8 0.0 - 1.0 mg/dL    GFR Estimate >90 >60 mL/min/1.73m2      Comment:      Effective December 21, 2021 eGFRcr in adults is calculated using the 2021 CKD-EPI creatinine equation which includes age and gender (Citlali matos al., NEJ, DOI: 10.1056/XBELvo7721195)   TSH   Result Value Ref Range    TSH 2.71 0.30 - 5.00 uIU/mL   Iron and iron binding capacity   Result Value Ref Range    Iron 49 35 - 180 ug/dL    Iron Binding Capacity 357 240 - 430 ug/dL    Iron Sat Index 14 (L) 15 - 46 %   UA Macro with Reflex to Micro and Culture - lab collect   Result Value Ref Range    Color Urine Yellow Colorless, Straw, Light Yellow, Yellow    Appearance Urine Clear Clear    Glucose Urine Negative Negative mg/dL    Bilirubin Urine Negative Negative    Ketones Urine Negative Negative mg/dL    Specific Gravity Urine 1.015 1.005  - 1.030    Blood Urine Negative Negative    pH Urine 7.0 5.0 - 8.0    Protein Albumin Urine Negative Negative mg/dL    Urobilinogen Urine 0.2 0.2, 1.0 E.U./dL    Nitrite Urine Negative Negative    Leukocyte Esterase Urine Small (A) Negative   Urine Microscopic   Result Value Ref Range    Bacteria Urine None Seen None Seen /HPF    RBC Urine None Seen 0-2 /HPF /HPF    WBC Urine 0-5 0-5 /HPF /HPF    Squamous Epithelials Urine Moderate (A) None Seen /LPF    Narrative    Urine Culture not indicated       If you have any questions or concerns, please call the clinic at the number listed above.       Sincerely,      Veto Mcgarry MD

## 2022-06-03 ENCOUNTER — MEDICAL CORRESPONDENCE (OUTPATIENT)
Dept: HEALTH INFORMATION MANAGEMENT | Facility: CLINIC | Age: 32
End: 2022-06-03
Payer: COMMERCIAL

## 2022-07-23 ENCOUNTER — HEALTH MAINTENANCE LETTER (OUTPATIENT)
Age: 32
End: 2022-07-23

## 2022-10-01 ENCOUNTER — HEALTH MAINTENANCE LETTER (OUTPATIENT)
Age: 32
End: 2022-10-01

## 2023-05-01 RX ORDER — CHLORHEXIDINE GLUCONATE ORAL RINSE 1.2 MG/ML
SOLUTION DENTAL
COMMUNITY
Start: 2023-02-01 | End: 2024-02-12

## 2023-05-02 ENCOUNTER — HOSPITAL ENCOUNTER (OUTPATIENT)
Dept: GENERAL RADIOLOGY | Facility: HOSPITAL | Age: 33
Discharge: HOME | End: 2023-05-02
Attending: STUDENT IN AN ORGANIZED HEALTH CARE EDUCATION/TRAINING PROGRAM | Admitting: STUDENT IN AN ORGANIZED HEALTH CARE EDUCATION/TRAINING PROGRAM
Payer: COMMERCIAL

## 2023-05-02 ENCOUNTER — OFFICE VISIT (OUTPATIENT)
Dept: FAMILY MEDICINE | Facility: CLINIC | Age: 33
End: 2023-05-02
Payer: COMMERCIAL

## 2023-05-02 VITALS
OXYGEN SATURATION: 100 % | TEMPERATURE: 97.7 F | DIASTOLIC BLOOD PRESSURE: 66 MMHG | WEIGHT: 166.7 LBS | RESPIRATION RATE: 18 BRPM | HEIGHT: 63 IN | HEART RATE: 84 BPM | BODY MASS INDEX: 29.54 KG/M2 | SYSTOLIC BLOOD PRESSURE: 102 MMHG

## 2023-05-02 DIAGNOSIS — M25.561 CHRONIC PAIN OF BOTH KNEES: ICD-10-CM

## 2023-05-02 DIAGNOSIS — M25.562 CHRONIC PAIN OF BOTH KNEES: ICD-10-CM

## 2023-05-02 DIAGNOSIS — G89.29 CHRONIC PAIN OF BOTH KNEES: ICD-10-CM

## 2023-05-02 DIAGNOSIS — Z86.2 HISTORY OF IRON DEFICIENCY ANEMIA: ICD-10-CM

## 2023-05-02 DIAGNOSIS — Z00.00 ROUTINE GENERAL MEDICAL EXAMINATION AT A HEALTH CARE FACILITY: Primary | ICD-10-CM

## 2023-05-02 DIAGNOSIS — Z97.5 NEXPLANON IN PLACE: ICD-10-CM

## 2023-05-02 LAB
ALBUMIN SERPL BCG-MCNC: 4.2 G/DL (ref 3.5–5.2)
ALP SERPL-CCNC: 72 U/L (ref 35–104)
ALT SERPL W P-5'-P-CCNC: 16 U/L (ref 10–35)
ANION GAP SERPL CALCULATED.3IONS-SCNC: 10 MMOL/L (ref 7–15)
AST SERPL W P-5'-P-CCNC: 25 U/L (ref 10–35)
BILIRUB SERPL-MCNC: 0.5 MG/DL
BUN SERPL-MCNC: 5.5 MG/DL (ref 6–20)
CALCIUM SERPL-MCNC: 9.3 MG/DL (ref 8.6–10)
CHLORIDE SERPL-SCNC: 106 MMOL/L (ref 98–107)
CHOLEST SERPL-MCNC: 121 MG/DL
CREAT SERPL-MCNC: 0.55 MG/DL (ref 0.51–0.95)
DEPRECATED HCO3 PLAS-SCNC: 25 MMOL/L (ref 22–29)
ERYTHROCYTE [DISTWIDTH] IN BLOOD BY AUTOMATED COUNT: 12.8 % (ref 10–15)
FERRITIN SERPL-MCNC: 27 NG/ML (ref 6–175)
GFR SERPL CREATININE-BSD FRML MDRD: >90 ML/MIN/1.73M2
GLUCOSE SERPL-MCNC: 94 MG/DL (ref 70–99)
HBA1C MFR BLD: 5.2 % (ref 0–5.6)
HCT VFR BLD AUTO: 41.8 % (ref 35–47)
HDLC SERPL-MCNC: 42 MG/DL
HGB BLD-MCNC: 14 G/DL (ref 11.7–15.7)
LDLC SERPL CALC-MCNC: 64 MG/DL
MCH RBC QN AUTO: 29.8 PG (ref 26.5–33)
MCHC RBC AUTO-ENTMCNC: 33.5 G/DL (ref 31.5–36.5)
MCV RBC AUTO: 89 FL (ref 78–100)
NONHDLC SERPL-MCNC: 79 MG/DL
PLATELET # BLD AUTO: 215 10E3/UL (ref 150–450)
POTASSIUM SERPL-SCNC: 3.8 MMOL/L (ref 3.4–5.3)
PROT SERPL-MCNC: 7.2 G/DL (ref 6.4–8.3)
RBC # BLD AUTO: 4.7 10E6/UL (ref 3.8–5.2)
SODIUM SERPL-SCNC: 141 MMOL/L (ref 136–145)
TRIGL SERPL-MCNC: 74 MG/DL
TSH SERPL DL<=0.005 MIU/L-ACNC: 3.05 UIU/ML (ref 0.3–4.2)
WBC # BLD AUTO: 6.5 10E3/UL (ref 4–11)

## 2023-05-02 PROCEDURE — 80061 LIPID PANEL: CPT | Performed by: STUDENT IN AN ORGANIZED HEALTH CARE EDUCATION/TRAINING PROGRAM

## 2023-05-02 PROCEDURE — 80053 COMPREHEN METABOLIC PANEL: CPT | Performed by: STUDENT IN AN ORGANIZED HEALTH CARE EDUCATION/TRAINING PROGRAM

## 2023-05-02 PROCEDURE — 82306 VITAMIN D 25 HYDROXY: CPT | Performed by: STUDENT IN AN ORGANIZED HEALTH CARE EDUCATION/TRAINING PROGRAM

## 2023-05-02 PROCEDURE — 99213 OFFICE O/P EST LOW 20 MIN: CPT | Mod: 25 | Performed by: STUDENT IN AN ORGANIZED HEALTH CARE EDUCATION/TRAINING PROGRAM

## 2023-05-02 PROCEDURE — 90472 IMMUNIZATION ADMIN EACH ADD: CPT | Performed by: STUDENT IN AN ORGANIZED HEALTH CARE EDUCATION/TRAINING PROGRAM

## 2023-05-02 PROCEDURE — 90471 IMMUNIZATION ADMIN: CPT | Performed by: STUDENT IN AN ORGANIZED HEALTH CARE EDUCATION/TRAINING PROGRAM

## 2023-05-02 PROCEDURE — 99395 PREV VISIT EST AGE 18-39: CPT | Mod: 25 | Performed by: STUDENT IN AN ORGANIZED HEALTH CARE EDUCATION/TRAINING PROGRAM

## 2023-05-02 PROCEDURE — 85027 COMPLETE CBC AUTOMATED: CPT | Performed by: STUDENT IN AN ORGANIZED HEALTH CARE EDUCATION/TRAINING PROGRAM

## 2023-05-02 PROCEDURE — 84443 ASSAY THYROID STIM HORMONE: CPT | Performed by: STUDENT IN AN ORGANIZED HEALTH CARE EDUCATION/TRAINING PROGRAM

## 2023-05-02 PROCEDURE — 82728 ASSAY OF FERRITIN: CPT | Performed by: STUDENT IN AN ORGANIZED HEALTH CARE EDUCATION/TRAINING PROGRAM

## 2023-05-02 PROCEDURE — 83036 HEMOGLOBIN GLYCOSYLATED A1C: CPT | Performed by: STUDENT IN AN ORGANIZED HEALTH CARE EDUCATION/TRAINING PROGRAM

## 2023-05-02 PROCEDURE — 73560 X-RAY EXAM OF KNEE 1 OR 2: CPT | Mod: 50

## 2023-05-02 PROCEDURE — 36415 COLL VENOUS BLD VENIPUNCTURE: CPT | Performed by: STUDENT IN AN ORGANIZED HEALTH CARE EDUCATION/TRAINING PROGRAM

## 2023-05-02 PROCEDURE — 90686 IIV4 VACC NO PRSV 0.5 ML IM: CPT | Performed by: STUDENT IN AN ORGANIZED HEALTH CARE EDUCATION/TRAINING PROGRAM

## 2023-05-02 PROCEDURE — 90715 TDAP VACCINE 7 YRS/> IM: CPT | Performed by: STUDENT IN AN ORGANIZED HEALTH CARE EDUCATION/TRAINING PROGRAM

## 2023-05-02 ASSESSMENT — ENCOUNTER SYMPTOMS
COUGH: 0
ARTHRALGIAS: 0
WEAKNESS: 0
FREQUENCY: 0
SHORTNESS OF BREATH: 0
PARESTHESIAS: 0
NAUSEA: 0
EYE PAIN: 0
DIZZINESS: 0
CHILLS: 1
PALPITATIONS: 0
SORE THROAT: 0
HEADACHES: 0
ABDOMINAL PAIN: 0
BREAST MASS: 0
HEARTBURN: 0
NERVOUS/ANXIOUS: 0
CONSTIPATION: 0
DYSURIA: 0
HEMATURIA: 0
JOINT SWELLING: 0
MYALGIAS: 1
DIARRHEA: 0
FEVER: 0
HEMATOCHEZIA: 0

## 2023-05-02 ASSESSMENT — PAIN SCALES - GENERAL: PAINLEVEL: NO PAIN (0)

## 2023-05-02 NOTE — LETTER
May 8, 2023      Tammie Odonnell  [address]    [facility] [place] [address]        Dear ,    We are writing to inform you of your test results.    Your HDL which is your 'good cholesterol' is a bit low - this will improve with increased exercise    Resulted Orders   Comprehensive metabolic panel (BMP + Alb, Alk Phos, ALT, AST, Total. Bili, TP)   Result Value Ref Range    Sodium 141 136 - 145 mmol/L    Potassium 3.8 3.4 - 5.3 mmol/L    Chloride 106 98 - 107 mmol/L    Carbon Dioxide (CO2) 25 22 - 29 mmol/L    Anion Gap 10 7 - 15 mmol/L    Urea Nitrogen 5.5 (L) 6.0 - 20.0 mg/dL    Creatinine 0.55 0.51 - 0.95 mg/dL    Calcium 9.3 8.6 - 10.0 mg/dL    Glucose 94 70 - 99 mg/dL    Alkaline Phosphatase 72 35 - 104 U/L    AST 25 10 - 35 U/L    ALT 16 10 - 35 U/L    Protein Total 7.2 6.4 - 8.3 g/dL    Albumin 4.2 3.5 - 5.2 g/dL    Bilirubin Total 0.5 <=1.2 mg/dL    GFR Estimate >90 >60 mL/min/1.73m2      Comment:      eGFR calculated using 2021 CKD-EPI equation.   Ferritin   Result Value Ref Range    Ferritin 27 6 - 175 ng/mL   Hemoglobin A1c   Result Value Ref Range    Hemoglobin A1C 5.2 0.0 - 5.6 %      Comment:      Normal <5.7%   Prediabetes 5.7-6.4%    Diabetes 6.5% or higher     Note: Adopted from ADA consensus guidelines.       If you have any questions or concerns, please call the clinic at the number listed above.       Sincerely,      Alyse Ortega, DO

## 2023-05-03 ENCOUNTER — TELEPHONE (OUTPATIENT)
Dept: FAMILY MEDICINE | Facility: CLINIC | Age: 33
End: 2023-05-03
Payer: COMMERCIAL

## 2023-05-03 DIAGNOSIS — M25.561 CHRONIC PAIN OF BOTH KNEES: Primary | ICD-10-CM

## 2023-05-03 DIAGNOSIS — G89.29 CHRONIC PAIN OF BOTH KNEES: Primary | ICD-10-CM

## 2023-05-03 DIAGNOSIS — M25.562 CHRONIC PAIN OF BOTH KNEES: Primary | ICD-10-CM

## 2023-05-03 LAB — DEPRECATED CALCIDIOL+CALCIFEROL SERPL-MC: 23 UG/L (ref 20–75)

## 2023-05-03 NOTE — TELEPHONE ENCOUNTER
----- Message from Alyse Ortega DO sent at 5/3/2023  6:25 AM CDT -----  Call patient x 2 and if no answer, please send letter       No arthritis is noted in either knee. At this time, I would like you to see the joint specialists, orthopedics for further insight into how to improve your knee pain. A referral has been placed.

## 2023-05-03 NOTE — TELEPHONE ENCOUNTER
Called patient two different times via  but the  stated that it nothing was picking up. Will send letter.

## 2023-06-08 ENCOUNTER — OFFICE VISIT (OUTPATIENT)
Dept: ORTHOPEDICS | Facility: CLINIC | Age: 33
End: 2023-06-08
Attending: STUDENT IN AN ORGANIZED HEALTH CARE EDUCATION/TRAINING PROGRAM
Payer: COMMERCIAL

## 2023-06-08 VITALS — BODY MASS INDEX: 29.64 KG/M2 | DIASTOLIC BLOOD PRESSURE: 87 MMHG | SYSTOLIC BLOOD PRESSURE: 125 MMHG | WEIGHT: 166 LBS

## 2023-06-08 DIAGNOSIS — M22.2X2 PATELLOFEMORAL PAIN SYNDROME OF BOTH KNEES: Primary | ICD-10-CM

## 2023-06-08 DIAGNOSIS — G89.29 CHRONIC PAIN OF BOTH KNEES: ICD-10-CM

## 2023-06-08 DIAGNOSIS — M22.8X9 PATELLAR MALTRACKING, UNSPECIFIED LATERALITY: ICD-10-CM

## 2023-06-08 DIAGNOSIS — M25.562 CHRONIC PAIN OF BOTH KNEES: ICD-10-CM

## 2023-06-08 DIAGNOSIS — M25.561 CHRONIC PAIN OF BOTH KNEES: ICD-10-CM

## 2023-06-08 DIAGNOSIS — M22.2X1 PATELLOFEMORAL PAIN SYNDROME OF BOTH KNEES: Primary | ICD-10-CM

## 2023-06-08 PROCEDURE — 99243 OFF/OP CNSLTJ NEW/EST LOW 30: CPT | Performed by: PEDIATRICS

## 2023-06-08 NOTE — PATIENT INSTRUCTIONS
Discussed causes of anterior knee pain and will have patient evaluated by Physical Therapy for work on strength balance.  They will need work on the entire kinetic chain.  They can use a neoprene knee sleeve, ice and OTC medications as needed for pain in the interim.  Low suspicion for internal derangement given current exam, however, would consider further imaging pending clinical course.    Plan:  - Today's Plan of Care:  Rehab: [phone]    Discussed activity considerations and other supportive care including Ice/Heat, OTC and other topical medications as needed.    Discussed McConnel taping or patellar stabilizing bracing    -We also discussed other future treatment options:  MRI if not improving    Follow Up: 6 - 8 weeks    If you have any further questions for your physician or physician s care team you can call [phone] and use option 3 to leave a voice message.

## 2023-06-08 NOTE — PROGRESS NOTES
ASSESSMENT & PLAN    Tammie was seen today for pain and pain.    Diagnoses and all orders for this visit:    Patellofemoral pain syndrome of both knees  -     Physical Therapy Referral; Future    Chronic pain of both knees  -     Orthopedic  Referral  -     Physical Therapy Referral; Future    Patellar maltracking, unspecified laterality  -     Physical Therapy Referral; Future      This issue is chronic and Unchanged.    Discussed causes of anterior knee pain and will have patient evaluated by Physical Therapy for work on strength balance.  They will need work on the entire kinetic chain.  They can use a neoprene knee sleeve, ice and OTC medications as needed for pain in the interim.  Low suspicion for internal derangement given current exam, however, would consider further imaging pending clinical course.    Plan:  - Today's Plan of Care:  Rehab: Physical Therapy: [facility] - [phone]    Discussed activity considerations and other supportive care including Ice/Heat, OTC and other topical medications as needed.    Discussed McConnel taping or patellar stabilizing bracing    -We also discussed other future treatment options:  MRI if not improving    Follow Up: 6 - 8 weeks    Concerning signs and symptoms were reviewed and all questions were answered at this time.    Thanks for the opportunity to participate in the care of this patient, I will keep you updated on their progress.    CC: Alyse Perez MD [facility]  Sports Medicine Physician  [facility]    -----  Chief Complaint   Patient presents with     Left Knee - Pain     Right Knee - Pain       SUBJECTIVE  Tammie TALA Odonnell is a/an 33 year old female who is seen in consultation at the request of  Alyse Ortega D.O. for evaluation of bilateral knee pain.    The patient is seen with family.  - [employer]  over the phone    Onset: 2 years(s) ago., chronic. Patient did have an injury at one point.  falling down while walking, knee hit the ground  Location of Pain: right, bilateral knee pain, anterior   Worsened by: walking, sitting to getting up   Better with: ice, tylenol   Treatments tried: ice, ibuprofen, physical therapy (few visits years ago) and previous imaging (xray 5/2/23)  Associated symptoms: swelling, tingling, weakness of left knee, locking or catching and feeling of instability    Orthopedic/Surgical history: YES - Date: chronic knee pain, 2 years   Social History/Occupation: working at [employer]      REVIEW OF SYSTEMS:  Review of Systems    OBJECTIVE:  /87   Wt 75.3 kg (166 lb)   LMP  (LMP Unknown)   BMI 29.64 kg/m     General: healthy, alert and in no distress  Skin: no suspicious lesions or rash.  CV: distal perfusion intact   Resp: normal respiratory effort without conversational dyspnea   Psych: normal mood and affect  Gait: NORMAL  Neuro: Normal light sensory exam of lower extremity    Bilateral Knee exam    Inspection:      no effusion, swelling of bruising bilateral    Patella:      Mobility -       hypermobile bilateral       + J Sign bilateral    Tender:      medial patellar border bilateral       medial joint line bilateral    Non Tender:      remainder of knee area bilateral    Knee ROM:      Full active and passive ROM with flexion and extension bilateral    Hip ROM:     Full active and passive ROM bilateral    Strength:      5/5 with knee extension bilateral    Special Tests:     neg (-) Darius bilateral       neg (-) anterior drawer bilateral       neg (-) posterior drawer bilateral       neg (-) varus at 0 deg and 30 deg bilateral       neg (-) valgus at 0 deg and 30 deg bilateral    Gait:      normal    Neurovascular:      2+ peripheral pulses bilaterally and brisk capillary refill       sensation grossly intact    RADIOLOGY:  Final results and radiologist's interpretation, available in the [facility] health record.  Images were reviewed with the patient in the office  today.  My personal interpretation of the performed imaging:  Reviewed 2 view x-rays from 5/2/2023 -no acute abnormality, no degenerative change    Results for orders placed or performed during the hospital encounter of 05/02/23   XR Knee Bilateral 1/2 Views    Narrative    EXAM: XR KNEE BILATERAL 1/2 VIEWS  LOCATION: [facility]  DATE/TIME: 5/2/2023 1:09 PM CDT    INDICATION: Pain.  COMPARISON: None.      Impression    IMPRESSION:   RIGHT KNEE: Normal joint spaces and alignment. No fracture or joint effusion.    LEFT KNEE: Normal joint spaces and alignment. No fracture or joint effusion.       Reviewed PCP note  Review of the result(s) of each unique test - XRs

## 2023-06-08 NOTE — LETTER
6/8/2023         RE: Tammie Odonnell  [address]  [address]  [place] [address]        Dear Colleague,    Thank you for referring your patient, Tammie Odonnell, to the [facility]. Please see a copy of my visit note below.    ASSESSMENT & PLAN    Tammie was seen today for pain and pain.    Diagnoses and all orders for this visit:    Patellofemoral pain syndrome of both knees  -     Physical Therapy Referral; Future    Chronic pain of both knees  -     Orthopedic  Referral  -     Physical Therapy Referral; Future    Patellar maltracking, unspecified laterality  -     Physical Therapy Referral; Future      This issue is chronic and Unchanged.    Discussed causes of anterior knee pain and will have patient evaluated by Physical Therapy for work on strength balance.  They will need work on the entire kinetic chain.  They can use a neoprene knee sleeve, ice and OTC medications as needed for pain in the interim.  Low suspicion for internal derangement given current exam, however, would consider further imaging pending clinical course.    Plan:  - Today's Plan of Care:  Rehab: Physical Therapy: [facility] - [phone]    Discussed activity considerations and other supportive care including Ice/Heat, OTC and other topical medications as needed.    Discussed McConnel taping or patellar stabilizing bracing    -We also discussed other future treatment options:  MRI if not improving    Follow Up: 6 - 8 weeks    Concerning signs and symptoms were reviewed and all questions were answered at this time.    Thanks for the opportunity to participate in the care of this patient, I will keep you updated on their progress.    CC: Alyse Perez MD [facility]  Sports Medicine Physician  [facility]    -----  Chief Complaint   Patient presents with     Left Knee - Pain     Right Knee - Pain       SUBJECTIVE  Tammie Odonnell is a/an 33 year old  female who is seen in consultation at the request of  Alyse Ortega D.O. for evaluation of bilateral knee pain.    The patient is seen with family.  - [employer]  over the phone    Onset: 2 years(s) ago., chronic. Patient did have an injury at one point. falling down while walking, knee hit the ground  Location of Pain: right, bilateral knee pain, anterior   Worsened by: walking, sitting to getting up   Better with: ice, tylenol   Treatments tried: ice, ibuprofen, physical therapy (few visits years ago) and previous imaging (xray 5/2/23)  Associated symptoms: swelling, tingling, weakness of left knee, locking or catching and feeling of instability    Orthopedic/Surgical history: YES - Date: chronic knee pain, 2 years   Social History/Occupation: working at [employer]      REVIEW OF SYSTEMS:  Review of Systems    OBJECTIVE:  /87   Wt 75.3 kg (166 lb)   LMP  (LMP Unknown)   BMI 29.64 kg/m     General: healthy, alert and in no distress  Skin: no suspicious lesions or rash.  CV: distal perfusion intact   Resp: normal respiratory effort without conversational dyspnea   Psych: normal mood and affect  Gait: NORMAL  Neuro: Normal light sensory exam of lower extremity    Bilateral Knee exam    Inspection:      no effusion, swelling of bruising bilateral    Patella:      Mobility -       hypermobile bilateral       + J Sign bilateral    Tender:      medial patellar border bilateral       medial joint line bilateral    Non Tender:      remainder of knee area bilateral    Knee ROM:      Full active and passive ROM with flexion and extension bilateral    Hip ROM:     Full active and passive ROM bilateral    Strength:      5/5 with knee extension bilateral    Special Tests:     neg (-) Darius bilateral       neg (-) anterior drawer bilateral       neg (-) posterior drawer bilateral       neg (-) varus at 0 deg and 30 deg bilateral       neg (-) valgus at 0 deg and 30 deg bilateral    Gait:       normal    Neurovascular:      2+ peripheral pulses bilaterally and brisk capillary refill       sensation grossly intact    RADIOLOGY:  Final results and radiologist's interpretation, available in the [facility] health record.  Images were reviewed with the patient in the office today.  My personal interpretation of the performed imaging:  Reviewed 2 view x-rays from 5/2/2023 -no acute abnormality, no degenerative change    Results for orders placed or performed during the hospital encounter of 05/02/23   XR Knee Bilateral 1/2 Views    Narrative    EXAM: XR KNEE BILATERAL 1/2 VIEWS  LOCATION: [facility]  DATE/TIME: 5/2/2023 1:09 PM CDT    INDICATION: Pain.  COMPARISON: None.      Impression    IMPRESSION:   RIGHT KNEE: Normal joint spaces and alignment. No fracture or joint effusion.    LEFT KNEE: Normal joint spaces and alignment. No fracture or joint effusion.       Reviewed PCP note  Review of the result(s) of each unique test - XRs             Again, thank you for allowing me to participate in the care of your patient.        Sincerely,        Antonieta Perez MD

## 2023-06-12 ENCOUNTER — OFFICE VISIT (OUTPATIENT)
Dept: MIDWIFE SERVICES | Facility: CLINIC | Age: 33
End: 2023-06-12
Payer: COMMERCIAL

## 2023-06-12 VITALS
BODY MASS INDEX: 28.53 KG/M2 | DIASTOLIC BLOOD PRESSURE: 58 MMHG | SYSTOLIC BLOOD PRESSURE: 102 MMHG | HEIGHT: 63 IN | WEIGHT: 161 LBS

## 2023-06-12 DIAGNOSIS — Z30.46 ENCOUNTER FOR NEXPLANON REMOVAL: Primary | ICD-10-CM

## 2023-06-12 PROBLEM — D50.9 IRON DEFICIENCY ANEMIA, UNSPECIFIED: Status: RESOLVED | Noted: 2022-01-20 | Resolved: 2023-06-12

## 2023-06-12 PROBLEM — Z97.5 NEXPLANON IN PLACE: Status: RESOLVED | Noted: 2023-05-02 | Resolved: 2023-06-12

## 2023-06-12 PROBLEM — Z34.90 PREGNANCY: Status: RESOLVED | Noted: 2021-11-11 | Resolved: 2023-06-12

## 2023-06-12 PROBLEM — E61.1 IRON DEFICIENCY: Status: RESOLVED | Noted: 2021-12-14 | Resolved: 2023-06-12

## 2023-06-12 PROBLEM — Z32.01 PREGNANCY TEST POSITIVE: Status: RESOLVED | Noted: 2021-03-15 | Resolved: 2023-06-12

## 2023-06-12 PROCEDURE — 11982 REMOVE DRUG IMPLANT DEVICE: CPT | Performed by: ADVANCED PRACTICE MIDWIFE

## 2023-06-12 RX ORDER — LIDOCAINE HYDROCHLORIDE AND EPINEPHRINE 10; 10 MG/ML; UG/ML
3 INJECTION, SOLUTION INFILTRATION; PERINEURAL ONCE
Status: COMPLETED | OUTPATIENT
Start: 2023-06-12 | End: 2023-06-12

## 2023-06-12 RX ADMIN — LIDOCAINE HYDROCHLORIDE AND EPINEPHRINE 3 ML: 10; 10 INJECTION, SOLUTION INFILTRATION; PERINEURAL at 15:54

## 2023-06-12 ASSESSMENT — ANXIETY QUESTIONNAIRES
1. FEELING NERVOUS, ANXIOUS, OR ON EDGE: NOT AT ALL
GAD7 TOTAL SCORE: 0
3. WORRYING TOO MUCH ABOUT DIFFERENT THINGS: NOT AT ALL
5. BEING SO RESTLESS THAT IT IS HARD TO SIT STILL: NOT AT ALL
7. FEELING AFRAID AS IF SOMETHING AWFUL MIGHT HAPPEN: NOT AT ALL
GAD7 TOTAL SCORE: 0
6. BECOMING EASILY ANNOYED OR IRRITABLE: NOT AT ALL
IF YOU CHECKED OFF ANY PROBLEMS ON THIS QUESTIONNAIRE, HOW DIFFICULT HAVE THESE PROBLEMS MADE IT FOR YOU TO DO YOUR WORK, TAKE CARE OF THINGS AT HOME, OR GET ALONG WITH OTHER PEOPLE: NOT DIFFICULT AT ALL
2. NOT BEING ABLE TO STOP OR CONTROL WORRYING: NOT AT ALL

## 2023-06-12 ASSESSMENT — PATIENT HEALTH QUESTIONNAIRE - PHQ9
SUM OF ALL RESPONSES TO PHQ QUESTIONS 1-9: 0
5. POOR APPETITE OR OVEREATING: NOT AT ALL

## 2023-06-12 NOTE — PROGRESS NOTES
Nexplanon Removal:     Is a pregnancy test required: No.  Was a consent obtained?  Yes    Tammie Odonnell is here for removal of etonogestrel implant Nexplanon/Implanon    Indication: bleeding for 1 month/mood changes.    Patient declined any interventions to address bleeding after discussion and Still desires Nexplanon to be removed.     Preoperative Diagnosis: etonogestrel implant  Postoperative Diagnosis: etonogestrel implant removed    Technique: On the left arm  Skin prep Betadine  Anesthesia 1% lidocaine, with epi  Procedure: Small incision (<5mm) was made at distal end of palpable implant, curved hemostat or mosquito forceps was used to isolate the implant and bring it to the incision, the fibrous capsule containing the implant  was incised and the Implant was removed intact.      EBL: minimal  Complications:  No  Tolerance:  Pt tolerated procedure well and was in stable condition.   Dressing:    A pressure bandage was placed for the next 12-24 hours.    Contraception was discussed and patient chose the following method condoms and natural family planning.        Follow up: Pt was instructed to call if bleeding, severe pain or foul smell.   Offered patient trial of ibuprofen and estrogen to help with unscheduled bleeding and she declined      Patient brought a  to interpret and declined formal interpreting services    Billing Provider Documentation:    I have reviewed and verified the documentation as completed by Crystal Esteves, JOSE G Student, of the procedure Nexplanon removal which I personally performed.     YOHAN Mcadams, LISA    06/12/23

## 2023-06-21 ENCOUNTER — THERAPY VISIT (OUTPATIENT)
Dept: PHYSICAL THERAPY | Facility: REHABILITATION | Age: 33
End: 2023-06-21
Attending: PEDIATRICS
Payer: COMMERCIAL

## 2023-06-21 DIAGNOSIS — M25.562 CHRONIC PAIN OF BOTH KNEES: ICD-10-CM

## 2023-06-21 DIAGNOSIS — M22.8X9 PATELLAR MALTRACKING, UNSPECIFIED LATERALITY: ICD-10-CM

## 2023-06-21 DIAGNOSIS — M22.2X2 PATELLOFEMORAL PAIN SYNDROME OF BOTH KNEES: ICD-10-CM

## 2023-06-21 DIAGNOSIS — M22.2X1 PATELLOFEMORAL PAIN SYNDROME OF BOTH KNEES: ICD-10-CM

## 2023-06-21 DIAGNOSIS — M25.561 CHRONIC PAIN OF BOTH KNEES: ICD-10-CM

## 2023-06-21 DIAGNOSIS — G89.29 CHRONIC PAIN OF BOTH KNEES: ICD-10-CM

## 2023-06-21 PROCEDURE — 97110 THERAPEUTIC EXERCISES: CPT | Mod: GP | Performed by: PHYSICAL THERAPIST

## 2023-06-21 PROCEDURE — 97161 PT EVAL LOW COMPLEX 20 MIN: CPT | Mod: GP | Performed by: PHYSICAL THERAPIST

## 2023-06-21 PROCEDURE — 97535 SELF CARE MNGMENT TRAINING: CPT | Mod: GP | Performed by: PHYSICAL THERAPIST

## 2023-06-21 NOTE — PATIENT INSTRUCTIONS
Look for knee brace - NEOPRENE KNEE SLEEVE WITH PATELLA CUT OUT        Try these 3 exercises and see how many repetitions you can do in a row    Straight leg raise x2-3 reps - work up to 10 reps in a row 2x/day      Bridge exercise x5-10 reps 1-2x/day      Bend knee up and down comfortable range x5-10 reps 2x/day

## 2023-06-21 NOTE — PROGRESS NOTES
PHYSICAL THERAPY EVALUATION  Type of Visit: Evaluation    See electronic medical record for Abuse and Falls Screening details.    Subjective      Presenting condition or subjective complaint:  Pt reports falling down on to her knees about 2 years ago. Pt reports when she is sitting down she can have her knees lock as she tries to get up. Pt reports she went to the doctor on 6/8/23 and xrays were negative but MD reported knee cap mal-tracking. Pt has tried tylenol and icing and it helps a little bit. Pt gets increased pain when she bends down for cleaning and when she tries to sit down, stairs, extended walking, standing for a prolonged period of time, bending to perform dependent care for her daughter.   Pt reports if she is laying in bed with knees straight she doesn't have knee pain but if they are bent she can have pain and sometimes wake up.  Pt can get pain in her knees after about 5 minutes of walking and pt would like to walk more than that.     Date of onset: 06/08/23      Prior Level of Function   Transfers: Independent  Ambulation: Independent  ADL: Independent      Living Environment  Social support:   lives with significant other or spouse  Type of home:   apartment   Stairs to enter the home:       n/a  Ramp:     Stairs inside the home:       n/a  Help at home:  none  Equipment owned:       Employment:      Hobbies/Interests:  walking, spend time with family    Patient goals for therapy:  less pain and locking with knees with daily tasks    Pain assessment:  0-10/10  - increased pain with time on her feet     Objective   KNEE EVALUATION      POSTURE: Forward head, rounded shoulders, increased thoracic kyphosis  GAIT:  Weightbearing Status: WBAT  Assistive Device(s): None  Gait Deviations: Antalgic B trendelenberg R > L    ROM: B knee 0-155 degrees with pain during and end range    STRENGTH: R hip flexor 3+/5 with knee pain, L hip flexor 4/5 with less knee pain, R quad 5/5 static testing but painful, B  DF 4/5 with pain in knees     FLEXIBILITY: B quad tightness    PALPATION: Tender with patellar mobility testing-  JOINT MOBILITY: Significant hypermobility B patella    Assessment & Plan   CLINICAL IMPRESSIONS   Medical Diagnosis: Chronic pain of both knees (M25.561, M25.562, G89.29)     Patellofemoral pain syndrome of both knees (M22.2X1, M22.2X2)     Patellar maltracking, unspecified laterality (M22.8X9)    Treatment Diagnosis: Chronic B knee pain with decreased ROM and strength, decreased gait and balance   Impression/Assessment: Patient is a 33 year old female with chronic B knee pain R > L complaints.  Pt reports fall onto her knees 2 years ago and continued pain ever since. The following significant findings have been identified: Pain, Decreased ROM/flexibility, Decreased joint mobility, Decreased strength, Impaired gait, Impaired muscle performance and Decreased activity tolerance. These impairments interfere with their ability to perform self care tasks, recreational activities, household chores, household mobility and community mobility as compared to previous level of function.     Clinical Decision Making (Complexity):   Clinical Presentation: Stable/Uncomplicated  Clinical Presentation Rationale: based on medical and personal factors listed in PT evaluation  Clinical Decision Making (Complexity): Low complexity    PLAN OF CARE  Treatment Interventions:  Interventions: Gait Training, Manual Therapy, Neuromuscular Re-education, Therapeutic Activity, Therapeutic Exercise, Self-Care/Home Management    Long Term Goals     PT Goal 1  Goal Description: Pt will be able to walk for 30 minutes without increase in B knee pain for improved quality of walking in 90 days.  Target Date: 09/18/23  PT Goal 2  Goal Description: Pt will be able to bend her knees and get up/down from chair/floor for transitions to play with daughter without increase in knee pain for improved QOL in 90 days.  Target Date: 09/18/23  PT Goal  3  Goal Description: Pt will be able to perform ADLs without B knee locking for improved ability to perform ADLs in 90 days.  Target Date: 09/18/23      Frequency of Treatment: 1x/week  Duration of Treatment: 90 days    Education Assessment:        Risks and benefits of evaluation/treatment have been explained.   Patient/Family/caregiver agrees with Plan of Care.     Evaluation Time:     PT Eval, Low Complexity Minutes (30998): 24      Signing Clinician: Noris Flynn PT, DPT, CLT      [facility]                                                                                   OUTPATIENT PHYSICAL THERAPY      PLAN OF TREATMENT FOR OUTPATIENT REHABILITATION   Patient's Last Name, First Name, Tammie Lundberg YOB: 1990   Provider's Name   [facility]   Medical Record No.  [id number]     Onset Date: 06/08/23  Start of Care Date: 06/21/23     Medical Diagnosis:  Chronic pain of both knees (M25.561, M25.562, G89.29)     Patellofemoral pain syndrome of both knees (M22.2X1, M22.2X2)     Patellar maltracking, unspecified laterality (M22.8X9)      PT Treatment Diagnosis:  Chronic B knee pain with decreased ROM and strength, decreased gait and balance Plan of Treatment  Frequency/Duration: 1x/week/ 90 days    Certification date from 06/21/23 to 09/18/23         See note for plan of treatment details and functional goals     Noris Flynn PT, DPT, CLT                         I CERTIFY THE NEED FOR THESE SERVICES FURNISHED UNDER        THIS PLAN OF TREATMENT AND WHILE UNDER MY CARE     (Physician attestation of this document indicates review and certification of the therapy plan).                  Referring Provider:  Antonieta Perez MD      Initial Assessment  See Epic Evaluation- Start of Care Date: 06/21/23

## 2023-08-07 PROBLEM — M22.2X1 PATELLOFEMORAL PAIN SYNDROME OF BOTH KNEES: Status: ACTIVE | Noted: 2023-08-07

## 2023-08-07 PROBLEM — M22.2X2 PATELLOFEMORAL PAIN SYNDROME OF BOTH KNEES: Status: ACTIVE | Noted: 2023-08-07

## 2023-08-07 PROBLEM — M22.8X9 PATELLAR MALTRACKING, UNSPECIFIED LATERALITY: Status: ACTIVE | Noted: 2023-08-07

## 2023-11-11 LAB
HEPATITIS B SURFACE ANTIGEN (EXTERNAL): NONREACTIVE
HEPATITIS C ANTIBODY (EXTERNAL): NONREACTIVE
HIV1+2 AB SERPL QL IA: NONREACTIVE
RUBELLA ANTIBODY IGG (EXTERNAL): NORMAL
VDRL (SYPHILIS) (EXTERNAL): NONREACTIVE

## 2023-12-26 ENCOUNTER — MYC MEDICAL ADVICE (OUTPATIENT)
Dept: OBGYN | Facility: CLINIC | Age: 33
End: 2023-12-26

## 2023-12-26 NOTE — TELEPHONE ENCOUNTER
"Panel Management Review    Date of last visit with a [facility] provider: Mary Gomez on 2023.  Date of next visit with a [facility] provider: None.    Health Maintenance List    Health Maintenance   Topic Date Due    HEPATITIS B IMMUNIZATION (1 of 3 - 3-dose series) Never done    COVID-19 Vaccine (1) Never done    HIV SCREENING  Never done    PAP  Never done    INFLUENZA VACCINE (1) 2023    YEARLY PREVENTIVE VISIT  2024    ADVANCE CARE PLANNING  03/15/2026    DTAP/TDAP/TD IMMUNIZATION (2 - Td or Tdap) 2033    HEPATITIS C SCREENING  Completed    PHQ-2 (once per calendar year)  Completed    Pneumococcal Vaccine: Pediatrics (0 to 5 Years) and At-Risk Patients (6 to 64 Years)  Aged Out    IPV IMMUNIZATION  Aged Out    HPV IMMUNIZATION  Aged Out    MENINGITIS IMMUNIZATION  Aged Out    RSV MONOCLONAL ANTIBODY  Aged Out       Composite cancer screening  Chart review shows that this patient is due/due soon for the following Pap Smear  No results found for: \"PAP\"  Past Surgical History:   Procedure Laterality Date    C/SECTION, LOW TRANSVERSE       SECTION N/A 2021    Procedure: REPEAT  SECTION;  Surgeon: Alan Olson MD;  Location: [facility]     Summary:    Patient is due/failing the following:   Pap Smear    Action needed: Patient needs office visit for Pap Smear.    Type of outreach:  Sent [employer] message.      Staff Signature:  Carmel Dave MA on 2023 at 9:50 AM      "

## 2024-01-08 ENCOUNTER — PATIENT OUTREACH (OUTPATIENT)
Dept: CARE COORDINATION | Facility: CLINIC | Age: 34
End: 2024-01-08

## 2024-01-08 NOTE — PROGRESS NOTES
Clinic Care Coordination Contact  Program:  [facility]: [place]  Renewal: [employer]   Date Applied:     SIENNA Outreach:   1/8/24: CTA called to see if patient needed assistance with their [employer] Renewal. Patient declined needing assistance and no follow up needed   Nelly Muniz  Care   [facility]  Clinic Care Coordination  [phone]      Health Insurance:      Referral/Screening:

## 2024-02-12 ENCOUNTER — OFFICE VISIT (OUTPATIENT)
Dept: FAMILY MEDICINE | Facility: CLINIC | Age: 34
End: 2024-02-12
Payer: COMMERCIAL

## 2024-02-12 VITALS
RESPIRATION RATE: 14 BRPM | HEART RATE: 100 BPM | DIASTOLIC BLOOD PRESSURE: 64 MMHG | SYSTOLIC BLOOD PRESSURE: 104 MMHG | OXYGEN SATURATION: 100 % | WEIGHT: 173.4 LBS | TEMPERATURE: 98.3 F | HEIGHT: 63 IN | BODY MASS INDEX: 30.72 KG/M2

## 2024-02-12 DIAGNOSIS — J10.1 INFLUENZA B: Primary | ICD-10-CM

## 2024-02-12 DIAGNOSIS — R09.81 NASAL CONGESTION: ICD-10-CM

## 2024-02-12 DIAGNOSIS — J02.0 STREP THROAT: ICD-10-CM

## 2024-02-12 DIAGNOSIS — R05.1 ACUTE COUGH: ICD-10-CM

## 2024-02-12 DIAGNOSIS — R52 BODY ACHES: ICD-10-CM

## 2024-02-12 DIAGNOSIS — J02.9 SORE THROAT: ICD-10-CM

## 2024-02-12 LAB
BASOPHILS # BLD AUTO: 0 10E3/UL (ref 0–0.2)
BASOPHILS NFR BLD AUTO: 0 %
DEPRECATED S PYO AG THROAT QL EIA: POSITIVE
EOSINOPHIL # BLD AUTO: 0 10E3/UL (ref 0–0.7)
EOSINOPHIL NFR BLD AUTO: 0 %
ERYTHROCYTE [DISTWIDTH] IN BLOOD BY AUTOMATED COUNT: 13.4 % (ref 10–15)
FLUAV RNA SPEC QL NAA+PROBE: NEGATIVE
FLUBV RNA RESP QL NAA+PROBE: POSITIVE
HCT VFR BLD AUTO: 39.2 % (ref 35–47)
HGB BLD-MCNC: 12.9 G/DL (ref 11.7–15.7)
IMM GRANULOCYTES # BLD: 0 10E3/UL
IMM GRANULOCYTES NFR BLD: 0 %
LYMPHOCYTES # BLD AUTO: 1.4 10E3/UL (ref 0.8–5.3)
LYMPHOCYTES NFR BLD AUTO: 28 %
MCH RBC QN AUTO: 27.5 PG (ref 26.5–33)
MCHC RBC AUTO-ENTMCNC: 32.9 G/DL (ref 31.5–36.5)
MCV RBC AUTO: 84 FL (ref 78–100)
MONOCYTES # BLD AUTO: 0.6 10E3/UL (ref 0–1.3)
MONOCYTES NFR BLD AUTO: 11 %
NEUTROPHILS # BLD AUTO: 3.2 10E3/UL (ref 1.6–8.3)
NEUTROPHILS NFR BLD AUTO: 61 %
PLATELET # BLD AUTO: 162 10E3/UL (ref 150–450)
RBC # BLD AUTO: 4.69 10E6/UL (ref 3.8–5.2)
RSV RNA SPEC NAA+PROBE: NEGATIVE
SARS-COV-2 RNA RESP QL NAA+PROBE: NEGATIVE
WBC # BLD AUTO: 5.2 10E3/UL (ref 4–11)

## 2024-02-12 PROCEDURE — 85025 COMPLETE CBC W/AUTO DIFF WBC: CPT

## 2024-02-12 PROCEDURE — 87637 SARSCOV2&INF A&B&RSV AMP PRB: CPT

## 2024-02-12 PROCEDURE — 80048 BASIC METABOLIC PNL TOTAL CA: CPT

## 2024-02-12 PROCEDURE — 87880 STREP A ASSAY W/OPTIC: CPT

## 2024-02-12 PROCEDURE — 99214 OFFICE O/P EST MOD 30 MIN: CPT

## 2024-02-12 PROCEDURE — 36415 COLL VENOUS BLD VENIPUNCTURE: CPT

## 2024-02-12 RX ORDER — BENZONATATE 100 MG/1
100 CAPSULE ORAL 3 TIMES DAILY PRN
Qty: 30 CAPSULE | Refills: 0 | Status: ON HOLD | OUTPATIENT
Start: 2024-02-12 | End: 2024-05-31

## 2024-02-12 RX ORDER — PENICILLIN V POTASSIUM 500 MG/1
500 TABLET, FILM COATED ORAL 2 TIMES DAILY
Qty: 20 TABLET | Refills: 0 | Status: SHIPPED | OUTPATIENT
Start: 2024-02-12 | End: 2024-02-22

## 2024-02-12 NOTE — PATIENT INSTRUCTIONS
Sudafed 60 mg every 6 hours for nasal congestion. You can take this for a few days to help with symptoms.

## 2024-02-12 NOTE — PROGRESS NOTES
Assessment & Plan     Influenza B  Positive for influenza B, likely contributing to symptoms.  Can continue with Tessalon Perles, sore throat spray, and over-the-counter medications for symptom management.  Vital signs are stable today, oxygen is 100%.  If patient has worsening symptoms or shortness of breath should be seen in the clinic.    Strep throat  Positive strep, will treat with penicillin twice daily for 10 days.  Notified patient over the phone.  - penicillin V (VEETID) 500 MG tablet; Take 1 tablet (500 mg) by mouth 2 times daily for 10 days    Nasal congestion  Body aches  Sore throat  Acute cough  See above, symptoms likely secondary to influenza B and strep throat infection.  Patient did request lab work as she feels she is much sicker than a normal viral infection.  CBC and BMP ordered.  - CBC with platelets and differential  - Basic metabolic panel  (Ca, Cl, CO2, Creat, Gluc, K, Na, BUN)    Zion Cho is a 34 year old, presenting for the following health issues:  Cough, Bodyaches, and Dizziness        2/12/2024     3:50 PM   Additional Questions   Roomed by Cherie MATUTE   Accompanied by  and kids     History of Present Illness       Reason for visit:  Bodyaches, Cough, and dizziness    She eats 0-1 servings of fruits and vegetables daily.She consumes 2 sweetened beverage(s) daily.She exercises with enough effort to increase her heart rate 9 or less minutes per day.  She exercises with enough effort to increase her heart rate 7 days per week.   She is taking medications regularly.      services used.     Symptoms started on Saturday. She has been having dizziness, body aches, and coughing. Sore throat. Headache congestion. No I didn't fell down. Dizzy and body became weak. No strength.     Dizziness  Onset/Duration: Start on Saturday  Description:   Do you feel faint: No  Does it feel like the surroundings (bed, room) are moving: No  Unsteady/off balance: No  Have you passed out  "or fallen: YES  Intensity: severe  Progression of Symptoms: same  Accompanying Signs & Symptoms:  Heart palpitations or chest pain: YES  Nausea, vomiting: No  Weakness or lack of coordination in arms or legs: YES- on both legs  Vision or speech changes: YES- speech because of the throat  Numbness or tingling: No  Ringing in ears (Tinnitus): YES  Hearing Loss: No  History:   Head trauma/concussion history: YES  Previous similar symptoms: No  Recent bleeding history: YES  Any new medications (BP?): No, takes only tylenol  Precipitating factors:   Worse with activity: YES- when standing  Worse with head movement: YES  Alleviating factors:   Does staying in a fixed position give relief: YES- when I lay down it help better.  Therapies tried and outcome: None    Concern - Bodyaches  Onset: Start on Saturday  Description: Sore and swollen legs  Intensity: moderate  Progression of Symptoms:  same  Accompanying Signs & Symptoms: N/A  Previous history of similar problem: No  Precipitating factors:        Worsened by: Yes when I walk around the house, feeding the kids,  Alleviating factors:        Improved by: No  Therapies tried and outcome: None      Review of Systems   Constitutional:  Positive for appetite change, chills, fatigue and fever.   HENT:  Positive for congestion, ear pain, postnasal drip, rhinorrhea, sinus pressure, sinus pain and sore throat.    Respiratory:  Positive for cough. Negative for shortness of breath and wheezing.          Objective    /64   Pulse 100   Temp 98.3  F (36.8  C) (Oral)   Resp 14   Ht 1.594 m (5' 2.75\")   Wt 78.7 kg (173 lb 6.4 oz)   LMP 09/07/2023 (Approximate)   SpO2 100%   BMI 30.96 kg/m    Body mass index is 30.96 kg/m .  Physical Exam  Constitutional:       General: She is in acute distress.   HENT:      Right Ear: Tympanic membrane, ear canal and external ear normal.      Left Ear: Tympanic membrane, ear canal and external ear normal.      Nose: Congestion present.    "   Mouth/Throat:      Pharynx: Posterior oropharyngeal erythema present. No oropharyngeal exudate.   Cardiovascular:      Rate and Rhythm: Normal rate and regular rhythm.      Pulses: Normal pulses.      Heart sounds: Normal heart sounds. No murmur heard.  Pulmonary:      Effort: No respiratory distress.      Breath sounds: Normal breath sounds.   Neurological:      Mental Status: She is alert.        At the end of the visit, I confirmed understanding of what was discussed. Patient has no further questions or concerns that were brought up at this time.     Benjamin Mays DNP, APRN, FNP-C

## 2024-02-13 ENCOUNTER — TELEPHONE (OUTPATIENT)
Dept: FAMILY MEDICINE | Facility: CLINIC | Age: 34
End: 2024-02-13

## 2024-02-13 LAB
ANION GAP SERPL CALCULATED.3IONS-SCNC: 13 MMOL/L (ref 7–15)
BUN SERPL-MCNC: 4.6 MG/DL (ref 6–20)
CALCIUM SERPL-MCNC: 8.9 MG/DL (ref 8.6–10)
CHLORIDE SERPL-SCNC: 103 MMOL/L (ref 98–107)
CREAT SERPL-MCNC: 0.41 MG/DL (ref 0.51–0.95)
DEPRECATED HCO3 PLAS-SCNC: 21 MMOL/L (ref 22–29)
EGFRCR SERPLBLD CKD-EPI 2021: >90 ML/MIN/1.73M2
GLUCOSE SERPL-MCNC: 69 MG/DL (ref 70–99)
POTASSIUM SERPL-SCNC: 3.7 MMOL/L (ref 3.4–5.3)
SODIUM SERPL-SCNC: 137 MMOL/L (ref 135–145)

## 2024-02-13 NOTE — TELEPHONE ENCOUNTER
1A - someone picked up and ended call if patient calls back please relay message below.    Benjamin Mays, YOHAN CNP  P [facility] Support Pool  Please call,    Patient is also positive for influenza B, likely why she is feeling so ill. Continue to rest and drink lots of fluids. If any trouble breathing or difficulty taking in fluids she should be seen. Her labs looked good, it looks like she has not been eating/drinking much as her blood sugar and other levels are low. Please try to drink water and eat what you can while this runs its course.    Please do not hesitate to reach out with any questions or concerns,    Benjamin Mays DNP, APRN, FNP-C

## 2024-02-14 ENCOUNTER — APPOINTMENT (OUTPATIENT)
Dept: INTERPRETER SERVICES | Facility: CLINIC | Age: 34
End: 2024-02-14

## 2024-02-14 ASSESSMENT — ENCOUNTER SYMPTOMS
SINUS PRESSURE: 1
COUGH: 1
SORE THROAT: 1
WHEEZING: 0
SHORTNESS OF BREATH: 0
APPETITE CHANGE: 1
FATIGUE: 1
FEVER: 1
SINUS PAIN: 1
RHINORRHEA: 1
CHILLS: 1

## 2024-02-14 NOTE — TELEPHONE ENCOUNTER
LMTCB if patient calls back please relay below message.    Patient active today on mychart. Sending [employer] message.

## 2024-04-02 ENCOUNTER — PATIENT OUTREACH (OUTPATIENT)
Dept: CARE COORDINATION | Facility: CLINIC | Age: 34
End: 2024-04-02

## 2024-04-16 ENCOUNTER — PATIENT OUTREACH (OUTPATIENT)
Dept: CARE COORDINATION | Facility: CLINIC | Age: 34
End: 2024-04-16

## 2024-05-22 ENCOUNTER — LAB REQUISITION (OUTPATIENT)
Dept: LAB | Facility: CLINIC | Age: 34
End: 2024-05-22
Payer: MEDICAID

## 2024-05-22 DIAGNOSIS — O99.019 ANEMIA COMPLICATING PREGNANCY, UNSPECIFIED TRIMESTER: ICD-10-CM

## 2024-05-22 PROCEDURE — 83550 IRON BINDING TEST: CPT | Mod: ORL | Performed by: OBSTETRICS & GYNECOLOGY

## 2024-05-22 PROCEDURE — 84466 ASSAY OF TRANSFERRIN: CPT | Mod: ORL | Performed by: OBSTETRICS & GYNECOLOGY

## 2024-05-22 PROCEDURE — 82728 ASSAY OF FERRITIN: CPT | Mod: ORL | Performed by: OBSTETRICS & GYNECOLOGY

## 2024-05-22 PROCEDURE — 85027 COMPLETE CBC AUTOMATED: CPT | Mod: ORL | Performed by: OBSTETRICS & GYNECOLOGY

## 2024-05-23 LAB
ACANTHOCYTES BLD QL SMEAR: ABNORMAL
AUER BODIES BLD QL SMEAR: ABNORMAL
BASO STIPL BLD QL SMEAR: ABNORMAL
BITE CELLS BLD QL SMEAR: ABNORMAL
BLISTER CELLS BLD QL SMEAR: ABNORMAL
BURR CELLS BLD QL SMEAR: ABNORMAL
DACRYOCYTES BLD QL SMEAR: ABNORMAL
ELLIPTOCYTES BLD QL SMEAR: SLIGHT
ERYTHROCYTE [DISTWIDTH] IN BLOOD BY AUTOMATED COUNT: 16.9 % (ref 10–15)
FERRITIN SERPL-MCNC: 10 NG/ML (ref 6–175)
FRAGMENTS BLD QL SMEAR: ABNORMAL
HCT VFR BLD AUTO: 35.5 % (ref 35–47)
HGB BLD-MCNC: 10.8 G/DL (ref 11.7–15.7)
HGB C CRYSTALS: ABNORMAL
HOLD SPECIMEN: NORMAL
HOWELL-JOLLY BOD BLD QL SMEAR: ABNORMAL
IRON BINDING CAPACITY (ROCHE): 444 UG/DL (ref 240–430)
IRON SATN MFR SERPL: 5 % (ref 15–46)
IRON SERPL-MCNC: 23 UG/DL (ref 37–145)
MCH RBC QN AUTO: 23.6 PG (ref 26.5–33)
MCHC RBC AUTO-ENTMCNC: 30.4 G/DL (ref 31.5–36.5)
MCV RBC AUTO: 78 FL (ref 78–100)
NEUTS HYPERSEG BLD QL SMEAR: ABNORMAL
PLAT MORPH BLD: ABNORMAL
PLATELET # BLD AUTO: 210 10E3/UL (ref 150–450)
POLYCHROMASIA BLD QL SMEAR: ABNORMAL
RBC # BLD AUTO: 4.57 10E6/UL (ref 3.8–5.2)
RBC AGGLUT BLD QL: ABNORMAL
RBC MORPH BLD: ABNORMAL
ROULEAUX BLD QL SMEAR: ABNORMAL
SICKLE CELLS BLD QL SMEAR: ABNORMAL
SMUDGE CELLS BLD QL SMEAR: ABNORMAL
SPHEROCYTES BLD QL SMEAR: ABNORMAL
STOMATOCYTES BLD QL SMEAR: ABNORMAL
TARGETS BLD QL SMEAR: ABNORMAL
TOXIC GRANULES BLD QL SMEAR: ABNORMAL
TRANSFERRIN SERPL-MCNC: 404 MG/DL (ref 200–360)
VARIANT LYMPHS BLD QL SMEAR: ABNORMAL
WBC # BLD AUTO: 9 10E3/UL (ref 4–11)

## 2024-05-31 ENCOUNTER — ANESTHESIA EVENT (OUTPATIENT)
Dept: OBGYN | Facility: HOSPITAL | Age: 34
End: 2024-05-31
Payer: MEDICAID

## 2024-05-31 ENCOUNTER — TRANSFERRED RECORDS (OUTPATIENT)
Dept: HEALTH INFORMATION MANAGEMENT | Facility: CLINIC | Age: 34
End: 2024-05-31

## 2024-05-31 ENCOUNTER — ANESTHESIA (OUTPATIENT)
Dept: OBGYN | Facility: HOSPITAL | Age: 34
End: 2024-05-31
Payer: MEDICAID

## 2024-05-31 ENCOUNTER — HOSPITAL ENCOUNTER (INPATIENT)
Facility: HOSPITAL | Age: 34
LOS: 2 days | Discharge: HOME HEALTH | End: 2024-06-02
Attending: OBSTETRICS & GYNECOLOGY | Admitting: OBSTETRICS & GYNECOLOGY
Payer: MEDICAID

## 2024-05-31 LAB
ABO/RH(D): NORMAL
ANTIBODY SCREEN: NEGATIVE
ERYTHROCYTE [DISTWIDTH] IN BLOOD BY AUTOMATED COUNT: 17.2 % (ref 10–15)
HCT VFR BLD AUTO: 35.9 % (ref 35–47)
HGB BLD-MCNC: 11 G/DL (ref 11.7–15.7)
MCH RBC QN AUTO: 23 PG (ref 26.5–33)
MCHC RBC AUTO-ENTMCNC: 30.6 G/DL (ref 31.5–36.5)
MCV RBC AUTO: 75 FL (ref 78–100)
PLATELET # BLD AUTO: 199 10E3/UL (ref 150–450)
RBC # BLD AUTO: 4.79 10E6/UL (ref 3.8–5.2)
SPECIMEN EXPIRATION DATE: NORMAL
T PALLIDUM AB SER QL: NONREACTIVE
WBC # BLD AUTO: 9.3 10E3/UL (ref 4–11)

## 2024-05-31 PROCEDURE — 85027 COMPLETE CBC AUTOMATED: CPT | Performed by: NURSE PRACTITIONER

## 2024-05-31 PROCEDURE — 250N000013 HC RX MED GY IP 250 OP 250 PS 637: Performed by: OBSTETRICS & GYNECOLOGY

## 2024-05-31 PROCEDURE — 86900 BLOOD TYPING SEROLOGIC ABO: CPT | Performed by: NURSE PRACTITIONER

## 2024-05-31 PROCEDURE — 250N000011 HC RX IP 250 OP 636: Performed by: NURSE ANESTHETIST, CERTIFIED REGISTERED

## 2024-05-31 PROCEDURE — 258N000003 HC RX IP 258 OP 636: Performed by: NURSE PRACTITIONER

## 2024-05-31 PROCEDURE — 370N000017 HC ANESTHESIA TECHNICAL FEE, PER MIN: Performed by: OBSTETRICS & GYNECOLOGY

## 2024-05-31 PROCEDURE — 36415 COLL VENOUS BLD VENIPUNCTURE: CPT | Performed by: NURSE PRACTITIONER

## 2024-05-31 PROCEDURE — 120N000001 HC R&B MED SURG/OB

## 2024-05-31 PROCEDURE — 86780 TREPONEMA PALLIDUM: CPT | Performed by: NURSE PRACTITIONER

## 2024-05-31 PROCEDURE — 250N000011 HC RX IP 250 OP 636: Performed by: STUDENT IN AN ORGANIZED HEALTH CARE EDUCATION/TRAINING PROGRAM

## 2024-05-31 PROCEDURE — 258N000003 HC RX IP 258 OP 636: Performed by: NURSE ANESTHETIST, CERTIFIED REGISTERED

## 2024-05-31 PROCEDURE — 250N000011 HC RX IP 250 OP 636: Performed by: NURSE PRACTITIONER

## 2024-05-31 PROCEDURE — 250N000009 HC RX 250: Performed by: NURSE ANESTHETIST, CERTIFIED REGISTERED

## 2024-05-31 PROCEDURE — 250N000011 HC RX IP 250 OP 636: Performed by: OBSTETRICS & GYNECOLOGY

## 2024-05-31 PROCEDURE — 360N000076 HC SURGERY LEVEL 3, PER MIN: Performed by: OBSTETRICS & GYNECOLOGY

## 2024-05-31 PROCEDURE — C9290 INJ, BUPIVACAINE LIPOSOME: HCPCS | Performed by: STUDENT IN AN ORGANIZED HEALTH CARE EDUCATION/TRAINING PROGRAM

## 2024-05-31 PROCEDURE — 250N000013 HC RX MED GY IP 250 OP 250 PS 637: Performed by: NURSE PRACTITIONER

## 2024-05-31 PROCEDURE — 272N000001 HC OR GENERAL SUPPLY STERILE: Performed by: OBSTETRICS & GYNECOLOGY

## 2024-05-31 PROCEDURE — 999N000249 HC STATISTIC C-SECTION ON UNIT

## 2024-05-31 RX ORDER — OXYCODONE HYDROCHLORIDE 5 MG/1
5 TABLET ORAL EVERY 4 HOURS PRN
Status: DISCONTINUED | OUTPATIENT
Start: 2024-05-31 | End: 2024-06-02 | Stop reason: HOSPADM

## 2024-05-31 RX ORDER — CITRIC ACID/SODIUM CITRATE 334-500MG
30 SOLUTION, ORAL ORAL
Status: COMPLETED | OUTPATIENT
Start: 2024-05-31 | End: 2024-05-31

## 2024-05-31 RX ORDER — SIMETHICONE 80 MG
80 TABLET,CHEWABLE ORAL 4 TIMES DAILY PRN
Status: DISCONTINUED | OUTPATIENT
Start: 2024-05-31 | End: 2024-06-02 | Stop reason: HOSPADM

## 2024-05-31 RX ORDER — OXYTOCIN/0.9 % SODIUM CHLORIDE 30/500 ML
100-340 PLASTIC BAG, INJECTION (ML) INTRAVENOUS CONTINUOUS PRN
Status: DISCONTINUED | OUTPATIENT
Start: 2024-05-31 | End: 2024-06-02 | Stop reason: HOSPADM

## 2024-05-31 RX ORDER — NALOXONE HYDROCHLORIDE 0.4 MG/ML
0.2 INJECTION, SOLUTION INTRAMUSCULAR; INTRAVENOUS; SUBCUTANEOUS
Status: DISCONTINUED | OUTPATIENT
Start: 2024-05-31 | End: 2024-06-02 | Stop reason: HOSPADM

## 2024-05-31 RX ORDER — ACETAMINOPHEN 325 MG/1
975 TABLET ORAL ONCE
Status: COMPLETED | OUTPATIENT
Start: 2024-05-31 | End: 2024-05-31

## 2024-05-31 RX ORDER — NALOXONE HYDROCHLORIDE 0.4 MG/ML
0.4 INJECTION, SOLUTION INTRAMUSCULAR; INTRAVENOUS; SUBCUTANEOUS
Status: DISCONTINUED | OUTPATIENT
Start: 2024-05-31 | End: 2024-06-02 | Stop reason: HOSPADM

## 2024-05-31 RX ORDER — LIDOCAINE 40 MG/G
CREAM TOPICAL
Status: DISCONTINUED | OUTPATIENT
Start: 2024-05-31 | End: 2024-05-31 | Stop reason: HOSPADM

## 2024-05-31 RX ORDER — OXYTOCIN 10 [USP'U]/ML
10 INJECTION, SOLUTION INTRAMUSCULAR; INTRAVENOUS
Status: DISCONTINUED | OUTPATIENT
Start: 2024-05-31 | End: 2024-05-31 | Stop reason: HOSPADM

## 2024-05-31 RX ORDER — MISOPROSTOL 200 UG/1
400 TABLET ORAL
Status: DISCONTINUED | OUTPATIENT
Start: 2024-05-31 | End: 2024-05-31 | Stop reason: HOSPADM

## 2024-05-31 RX ORDER — CARBOPROST TROMETHAMINE 250 UG/ML
250 INJECTION, SOLUTION INTRAMUSCULAR
Status: DISCONTINUED | OUTPATIENT
Start: 2024-05-31 | End: 2024-06-02 | Stop reason: HOSPADM

## 2024-05-31 RX ORDER — ACETAMINOPHEN 325 MG/1
975 TABLET ORAL EVERY 6 HOURS
Status: DISCONTINUED | OUTPATIENT
Start: 2024-05-31 | End: 2024-06-02 | Stop reason: HOSPADM

## 2024-05-31 RX ORDER — LOPERAMIDE HCL 2 MG
2 CAPSULE ORAL
Status: DISCONTINUED | OUTPATIENT
Start: 2024-05-31 | End: 2024-05-31 | Stop reason: HOSPADM

## 2024-05-31 RX ORDER — PROCHLORPERAZINE 25 MG
25 SUPPOSITORY, RECTAL RECTAL EVERY 12 HOURS PRN
Status: DISCONTINUED | OUTPATIENT
Start: 2024-05-31 | End: 2024-06-02 | Stop reason: HOSPADM

## 2024-05-31 RX ORDER — BUPIVACAINE HYDROCHLORIDE 7.5 MG/ML
INJECTION, SOLUTION INTRASPINAL
Status: COMPLETED | OUTPATIENT
Start: 2024-05-31 | End: 2024-05-31

## 2024-05-31 RX ORDER — METOCLOPRAMIDE 10 MG/1
10 TABLET ORAL EVERY 6 HOURS PRN
Status: DISCONTINUED | OUTPATIENT
Start: 2024-05-31 | End: 2024-06-02 | Stop reason: HOSPADM

## 2024-05-31 RX ORDER — OXYTOCIN/0.9 % SODIUM CHLORIDE 30/500 ML
340 PLASTIC BAG, INJECTION (ML) INTRAVENOUS CONTINUOUS PRN
Status: DISCONTINUED | OUTPATIENT
Start: 2024-05-31 | End: 2024-06-02 | Stop reason: HOSPADM

## 2024-05-31 RX ORDER — AMOXICILLIN 250 MG
2 CAPSULE ORAL 2 TIMES DAILY
Status: DISCONTINUED | OUTPATIENT
Start: 2024-05-31 | End: 2024-06-02 | Stop reason: HOSPADM

## 2024-05-31 RX ORDER — METOCLOPRAMIDE HYDROCHLORIDE 5 MG/ML
10 INJECTION INTRAMUSCULAR; INTRAVENOUS EVERY 6 HOURS PRN
Status: DISCONTINUED | OUTPATIENT
Start: 2024-05-31 | End: 2024-06-02 | Stop reason: HOSPADM

## 2024-05-31 RX ORDER — TRANEXAMIC ACID 10 MG/ML
1 INJECTION, SOLUTION INTRAVENOUS EVERY 30 MIN PRN
Status: DISCONTINUED | OUTPATIENT
Start: 2024-05-31 | End: 2024-05-31 | Stop reason: HOSPADM

## 2024-05-31 RX ORDER — HYDROCORTISONE 25 MG/G
CREAM TOPICAL 3 TIMES DAILY PRN
Status: DISCONTINUED | OUTPATIENT
Start: 2024-05-31 | End: 2024-06-02 | Stop reason: HOSPADM

## 2024-05-31 RX ORDER — LOPERAMIDE HCL 2 MG
2 CAPSULE ORAL
Status: DISCONTINUED | OUTPATIENT
Start: 2024-05-31 | End: 2024-06-02 | Stop reason: HOSPADM

## 2024-05-31 RX ORDER — OXYTOCIN/0.9 % SODIUM CHLORIDE 30/500 ML
PLASTIC BAG, INJECTION (ML) INTRAVENOUS CONTINUOUS PRN
Status: DISCONTINUED | OUTPATIENT
Start: 2024-05-31 | End: 2024-05-31

## 2024-05-31 RX ORDER — BUPIVACAINE HYDROCHLORIDE 2.5 MG/ML
INJECTION, SOLUTION EPIDURAL; INFILTRATION; INTRACAUDAL
Status: COMPLETED | OUTPATIENT
Start: 2024-05-31 | End: 2024-05-31

## 2024-05-31 RX ORDER — IBUPROFEN 800 MG/1
800 TABLET, FILM COATED ORAL EVERY 6 HOURS
Status: DISCONTINUED | OUTPATIENT
Start: 2024-06-01 | End: 2024-06-02 | Stop reason: HOSPADM

## 2024-05-31 RX ORDER — OXYTOCIN 10 [USP'U]/ML
10 INJECTION, SOLUTION INTRAMUSCULAR; INTRAVENOUS
Status: DISCONTINUED | OUTPATIENT
Start: 2024-05-31 | End: 2024-06-02 | Stop reason: HOSPADM

## 2024-05-31 RX ORDER — METHYLERGONOVINE MALEATE 0.2 MG/ML
200 INJECTION INTRAVENOUS
Status: DISCONTINUED | OUTPATIENT
Start: 2024-05-31 | End: 2024-05-31 | Stop reason: HOSPADM

## 2024-05-31 RX ORDER — FENTANYL CITRATE 50 UG/ML
INJECTION, SOLUTION INTRAMUSCULAR; INTRAVENOUS
Status: COMPLETED | OUTPATIENT
Start: 2024-05-31 | End: 2024-05-31

## 2024-05-31 RX ORDER — SODIUM CHLORIDE, SODIUM LACTATE, POTASSIUM CHLORIDE, CALCIUM CHLORIDE 600; 310; 30; 20 MG/100ML; MG/100ML; MG/100ML; MG/100ML
INJECTION, SOLUTION INTRAVENOUS CONTINUOUS
Status: DISCONTINUED | OUTPATIENT
Start: 2024-05-31 | End: 2024-05-31 | Stop reason: HOSPADM

## 2024-05-31 RX ORDER — MORPHINE SULFATE 0.5 MG/ML
150 INJECTION, SOLUTION EPIDURAL; INTRATHECAL; INTRAVENOUS ONCE
Status: DISCONTINUED | OUTPATIENT
Start: 2024-05-31 | End: 2024-05-31 | Stop reason: HOSPADM

## 2024-05-31 RX ORDER — MODIFIED LANOLIN
OINTMENT (GRAM) TOPICAL
Status: DISCONTINUED | OUTPATIENT
Start: 2024-05-31 | End: 2024-06-02 | Stop reason: HOSPADM

## 2024-05-31 RX ORDER — KETOROLAC TROMETHAMINE 30 MG/ML
30 INJECTION, SOLUTION INTRAMUSCULAR; INTRAVENOUS EVERY 6 HOURS
Qty: 3 ML | Refills: 0 | Status: COMPLETED | OUTPATIENT
Start: 2024-05-31 | End: 2024-06-01

## 2024-05-31 RX ORDER — LIDOCAINE 40 MG/G
CREAM TOPICAL
Status: DISCONTINUED | OUTPATIENT
Start: 2024-05-31 | End: 2024-06-02 | Stop reason: HOSPADM

## 2024-05-31 RX ORDER — TRANEXAMIC ACID 10 MG/ML
1 INJECTION, SOLUTION INTRAVENOUS EVERY 30 MIN PRN
Status: DISCONTINUED | OUTPATIENT
Start: 2024-05-31 | End: 2024-06-02 | Stop reason: HOSPADM

## 2024-05-31 RX ORDER — FENTANYL CITRATE 50 UG/ML
10 INJECTION, SOLUTION INTRAMUSCULAR; INTRAVENOUS ONCE
Status: DISCONTINUED | OUTPATIENT
Start: 2024-05-31 | End: 2024-05-31 | Stop reason: HOSPADM

## 2024-05-31 RX ORDER — OXYTOCIN/0.9 % SODIUM CHLORIDE 30/500 ML
340 PLASTIC BAG, INJECTION (ML) INTRAVENOUS CONTINUOUS PRN
Status: DISCONTINUED | OUTPATIENT
Start: 2024-05-31 | End: 2024-05-31 | Stop reason: HOSPADM

## 2024-05-31 RX ORDER — PROCHLORPERAZINE MALEATE 10 MG
10 TABLET ORAL EVERY 6 HOURS PRN
Status: DISCONTINUED | OUTPATIENT
Start: 2024-05-31 | End: 2024-06-02 | Stop reason: HOSPADM

## 2024-05-31 RX ORDER — KETOROLAC TROMETHAMINE 30 MG/ML
INJECTION, SOLUTION INTRAMUSCULAR; INTRAVENOUS
Status: COMPLETED
Start: 2024-05-31 | End: 2024-05-31

## 2024-05-31 RX ORDER — ONDANSETRON 2 MG/ML
4 INJECTION INTRAMUSCULAR; INTRAVENOUS EVERY 6 HOURS PRN
Status: DISCONTINUED | OUTPATIENT
Start: 2024-05-31 | End: 2024-06-02 | Stop reason: HOSPADM

## 2024-05-31 RX ORDER — FENTANYL CITRATE-0.9 % NACL/PF 10 MCG/ML
100 PLASTIC BAG, INJECTION (ML) INTRAVENOUS EVERY 5 MIN PRN
Status: DISCONTINUED | OUTPATIENT
Start: 2024-05-31 | End: 2024-05-31 | Stop reason: HOSPADM

## 2024-05-31 RX ORDER — MISOPROSTOL 200 UG/1
400 TABLET ORAL
Status: DISCONTINUED | OUTPATIENT
Start: 2024-05-31 | End: 2024-06-02 | Stop reason: HOSPADM

## 2024-05-31 RX ORDER — MORPHINE SULFATE 1 MG/ML
INJECTION, SOLUTION EPIDURAL; INTRATHECAL; INTRAVENOUS
Status: COMPLETED | OUTPATIENT
Start: 2024-05-31 | End: 2024-05-31

## 2024-05-31 RX ORDER — MISOPROSTOL 200 UG/1
800 TABLET ORAL
Status: DISCONTINUED | OUTPATIENT
Start: 2024-05-31 | End: 2024-06-02 | Stop reason: HOSPADM

## 2024-05-31 RX ORDER — NALBUPHINE HYDROCHLORIDE 20 MG/ML
2.5-5 INJECTION, SOLUTION INTRAMUSCULAR; INTRAVENOUS; SUBCUTANEOUS EVERY 6 HOURS PRN
Status: DISCONTINUED | OUTPATIENT
Start: 2024-05-31 | End: 2024-06-02 | Stop reason: HOSPADM

## 2024-05-31 RX ORDER — CEFAZOLIN SODIUM/WATER 2 G/20 ML
2 SYRINGE (ML) INTRAVENOUS
Status: COMPLETED | OUTPATIENT
Start: 2024-05-31 | End: 2024-05-31

## 2024-05-31 RX ORDER — BISACODYL 10 MG
10 SUPPOSITORY, RECTAL RECTAL DAILY PRN
Status: DISCONTINUED | OUTPATIENT
Start: 2024-06-02 | End: 2024-06-02 | Stop reason: HOSPADM

## 2024-05-31 RX ORDER — ACETAMINOPHEN 325 MG/1
975 TABLET ORAL EVERY 6 HOURS
Status: DISCONTINUED | OUTPATIENT
Start: 2024-05-31 | End: 2024-05-31

## 2024-05-31 RX ORDER — ONDANSETRON 2 MG/ML
INJECTION INTRAMUSCULAR; INTRAVENOUS PRN
Status: DISCONTINUED | OUTPATIENT
Start: 2024-05-31 | End: 2024-05-31

## 2024-05-31 RX ORDER — LOPERAMIDE HCL 2 MG
4 CAPSULE ORAL
Status: DISCONTINUED | OUTPATIENT
Start: 2024-05-31 | End: 2024-06-02 | Stop reason: HOSPADM

## 2024-05-31 RX ORDER — ONDANSETRON 4 MG/1
4 TABLET, ORALLY DISINTEGRATING ORAL EVERY 6 HOURS PRN
Status: DISCONTINUED | OUTPATIENT
Start: 2024-05-31 | End: 2024-06-02 | Stop reason: HOSPADM

## 2024-05-31 RX ORDER — CARBOPROST TROMETHAMINE 250 UG/ML
250 INJECTION, SOLUTION INTRAMUSCULAR
Status: DISCONTINUED | OUTPATIENT
Start: 2024-05-31 | End: 2024-05-31 | Stop reason: HOSPADM

## 2024-05-31 RX ORDER — MISOPROSTOL 200 UG/1
800 TABLET ORAL
Status: DISCONTINUED | OUTPATIENT
Start: 2024-05-31 | End: 2024-05-31 | Stop reason: HOSPADM

## 2024-05-31 RX ORDER — DIPHENHYDRAMINE HCL 25 MG
25-50 CAPSULE ORAL EVERY 6 HOURS PRN
Status: DISCONTINUED | OUTPATIENT
Start: 2024-05-31 | End: 2024-06-02 | Stop reason: HOSPADM

## 2024-05-31 RX ORDER — CEFAZOLIN SODIUM/WATER 2 G/20 ML
2 SYRINGE (ML) INTRAVENOUS SEE ADMIN INSTRUCTIONS
Status: DISCONTINUED | OUTPATIENT
Start: 2024-05-31 | End: 2024-05-31 | Stop reason: HOSPADM

## 2024-05-31 RX ORDER — LOPERAMIDE HCL 2 MG
4 CAPSULE ORAL
Status: DISCONTINUED | OUTPATIENT
Start: 2024-05-31 | End: 2024-05-31 | Stop reason: HOSPADM

## 2024-05-31 RX ORDER — METHYLERGONOVINE MALEATE 0.2 MG/ML
200 INJECTION INTRAVENOUS
Status: DISCONTINUED | OUTPATIENT
Start: 2024-05-31 | End: 2024-06-02 | Stop reason: HOSPADM

## 2024-05-31 RX ORDER — DEXTROSE, SODIUM CHLORIDE, SODIUM LACTATE, POTASSIUM CHLORIDE, AND CALCIUM CHLORIDE 5; .6; .31; .03; .02 G/100ML; G/100ML; G/100ML; G/100ML; G/100ML
INJECTION, SOLUTION INTRAVENOUS CONTINUOUS
Status: DISCONTINUED | OUTPATIENT
Start: 2024-05-31 | End: 2024-06-02 | Stop reason: HOSPADM

## 2024-05-31 RX ORDER — AMOXICILLIN 250 MG
1 CAPSULE ORAL 2 TIMES DAILY
Status: DISCONTINUED | OUTPATIENT
Start: 2024-05-31 | End: 2024-06-02 | Stop reason: HOSPADM

## 2024-05-31 RX ADMIN — SODIUM CHLORIDE, POTASSIUM CHLORIDE, SODIUM LACTATE AND CALCIUM CHLORIDE: 600; 310; 30; 20 INJECTION, SOLUTION INTRAVENOUS at 12:06

## 2024-05-31 RX ADMIN — KETOROLAC TROMETHAMINE 30 MG: 30 INJECTION, SOLUTION INTRAMUSCULAR at 14:58

## 2024-05-31 RX ADMIN — BUPIVACAINE HYDROCHLORIDE IN DEXTROSE 1.6 ML: 7.5 INJECTION, SOLUTION SUBARACHNOID at 13:13

## 2024-05-31 RX ADMIN — FENTANYL CITRATE 10 MCG: 50 INJECTION INTRAMUSCULAR; INTRAVENOUS at 13:13

## 2024-05-31 RX ADMIN — KETOROLAC TROMETHAMINE 30 MG: 30 INJECTION, SOLUTION INTRAMUSCULAR at 21:29

## 2024-05-31 RX ADMIN — SENNOSIDES AND DOCUSATE SODIUM 2 TABLET: 8.6; 5 TABLET ORAL at 21:30

## 2024-05-31 RX ADMIN — Medication 0.15 MG: at 13:13

## 2024-05-31 RX ADMIN — PHENYLEPHRINE HYDROCHLORIDE 100 MCG: 10 INJECTION INTRAVENOUS at 13:16

## 2024-05-31 RX ADMIN — DIPHENHYDRAMINE HYDROCHLORIDE 25 MG: 25 CAPSULE ORAL at 18:04

## 2024-05-31 RX ADMIN — PHENYLEPHRINE HYDROCHLORIDE 0.2 MCG/KG/MIN: 10 INJECTION INTRAVENOUS at 13:19

## 2024-05-31 RX ADMIN — Medication 2 G: at 13:07

## 2024-05-31 RX ADMIN — ACETAMINOPHEN 975 MG: 325 TABLET ORAL at 12:09

## 2024-05-31 RX ADMIN — NALBUPHINE HYDROCHLORIDE 2.6 MG: 20 INJECTION, SOLUTION INTRAMUSCULAR; INTRAVENOUS; SUBCUTANEOUS at 14:53

## 2024-05-31 RX ADMIN — BUPIVACAINE HYDROCHLORIDE 20 ML: 2.5 INJECTION, SOLUTION EPIDURAL; INFILTRATION; INTRACAUDAL at 14:10

## 2024-05-31 RX ADMIN — ACETAMINOPHEN 975 MG: 325 TABLET ORAL at 18:04

## 2024-05-31 RX ADMIN — SODIUM CITRATE AND CITRIC ACID MONOHYDRATE 30 ML: 500; 334 SOLUTION ORAL at 12:09

## 2024-05-31 RX ADMIN — BUPIVACAINE 20 ML: 13.3 INJECTION, SUSPENSION, LIPOSOMAL INFILTRATION at 14:10

## 2024-05-31 RX ADMIN — ONDANSETRON 4 MG: 2 INJECTION INTRAMUSCULAR; INTRAVENOUS at 13:12

## 2024-05-31 RX ADMIN — ACETAMINOPHEN 975 MG: 325 TABLET ORAL at 23:31

## 2024-05-31 RX ADMIN — Medication 340 ML/HR: at 13:42

## 2024-05-31 ASSESSMENT — ACTIVITIES OF DAILY LIVING (ADL)
ADLS_ACUITY_SCORE: 19
ADLS_ACUITY_SCORE: 20
ADLS_ACUITY_SCORE: 19
ADLS_ACUITY_SCORE: 20
ADLS_ACUITY_SCORE: 19
ADLS_ACUITY_SCORE: 20
ADLS_ACUITY_SCORE: 20
ADLS_ACUITY_SCORE: 19
ADLS_ACUITY_SCORE: 20
ADLS_ACUITY_SCORE: 34

## 2024-05-31 NOTE — ANESTHESIA PROCEDURE NOTES
TAP Procedure Note    Pre-Procedure   Staff -        Anesthesiologist:  Destin Webb DO       Performed By: anesthesiologist       Location: pre-op       Procedure Start/Stop Times: 5/31/2024 2:10 PM and 5/31/2024 2:15 PM       Pre-Anesthestic Checklist: patient identified, IV checked, site marked, risks and benefits discussed, informed consent, monitors and equipment checked, pre-op evaluation, at physician/surgeon's request and post-op pain management  Timeout:       Correct Patient: Yes        Correct Procedure: Yes        Correct Site: Yes        Correct Position: Yes        Correct Laterality: Yes        Site Marked: Yes  Procedure Documentation  Procedure: TAP       Diagnosis: POST OPERATIVE PAIN       Laterality: bilateral       Patient Position: supine       Patient Prep/Sterile Barriers: sterile gloves, mask       Skin prep: Chloraprep       Needle Type: short bevel       Needle Gauge: 21.        Needle Length (millimeters): 110        Ultrasound guided       1. Ultrasound was used to identify targeted nerve, plexus, vascular marker, or fascial plane and place a needle adjacent to it in real-time.       2. Ultrasound was used to visualize the spread of anesthetic in close proximity to the above referenced structure.       3. A permanent image is entered into the patient's record.    Assessment/Narrative         The placement was negative for: blood aspirated, painful injection and site bleeding       Paresthesias: No.       Bolus given via needle..        Secured via.        Insertion/Infusion Method: Single Shot       Complications: none       Injection made incrementally with aspirations every 5 mL.    Medication(s) Administered   Bupivacaine 0.25% PF (Infiltration) - Infiltration   20 mL - 5/31/2024 2:10:00 PM  Bupivacaine liposome (Exparel) 1.3% LA inj susp (Infiltration) - Infiltration   20 mL - 5/31/2024 2:10:00 PM  Medication Administration Time: 5/31/2024 2:10 PM      FOR [facility]  "([facility]) ONLY:   Pain Team Contact information: please page the Pain Team Via [employer]. Search \"Pain\". During daytime hours, please page the attending first. At night please page the resident first.      "

## 2024-05-31 NOTE — PROGRESS NOTES
Data: Patient admitted to room 8 at 1125. Patient is a . Prenatal record reviewed.   OB History    Para Term  AB Living   6 5 4 1 0 3   SAB IAB Ectopic Multiple Live Births   0 0 0 0 1      # Outcome Date GA Lbr Allen/2nd Weight Sex Type Anes PTL Lv   6 Current            5 Term 21 40w1d  2.81 kg (6 lb 3.1 oz) F CS-LTranv Spinal N ZAKIYA      Name: DOROTHY,FEMALE-DARWIN      Apgar1: 8  Apgar5: 9   4 Term            3 Term            2 Term            1             .  Medical History: History reviewed. No pertinent past medical history..  Gestational age 40w2d. Vital signs per doc flowsheet. Fetal movement present. Patient reports scheduled repeat caesarean section as reason for admission. Support persons and  present.  Action: Verbal consent for EFM, external fetal monitors applied. Admission assessment completed and  prep complete. Patient and support persons educated on labor process. Patient oriented to room, call light in reach.   Response: Dr. Romeo informed of patient arrival.

## 2024-05-31 NOTE — PROGRESS NOTES
Mom  Name/support person: Tammie Odonnell    Method of delivery: repeat c/sec  date/time: 2024 @ 1341        QBL: 305     Blood type: A POS   Rubella Status: immune   Fundus/bleeding: firm at U/ scant bleeding   TAP block or Duramorph: TAP block   By /LISA: Ousmane   : Josue   Mood assessment: COMPLETE       Infant Admission Data:     Gender/Name: Male/Cheryl   Gestational age: 40w2d     Birth Weight: 6 lbs 9oz  Breast or Bottle: both Last ate at: 1600: 15 mL   Voiding and Stooling: no void/no stool   Meds administered: received all baby meds

## 2024-05-31 NOTE — ANESTHESIA PROCEDURE NOTES
"Intrathecal injection Procedure Note    Pre-Procedure   Staff -        Anesthesiologist:  Destin Webb DO       Performed By: anesthesiologist       Location: OB       Procedure Start/Stop Times: 5/31/2024 1:13 PM and 5/31/2024 1:15 PM       Pre-Anesthestic Checklist: patient identified, IV checked, risks and benefits discussed, informed consent, monitors and equipment checked, pre-op evaluation, at physician/surgeon's request and post-op pain management  Timeout:       Correct Patient: Yes        Correct Procedure: Yes        Correct Site: Yes        Correct Position: Yes   Procedure Documentation  Procedure: intrathecal injection       Patient Position: sitting       Skin prep: Chloraprep       Insertion Site: L3-4. (midline approach).       Needle Gauge: 25.        Needle Length (Inches): 3.5        Spinal Needle Type: Pencan       Introducer used       Introducer: 20 G       # of attempts: 1 and  # of redirects:  0    Assessment/Narrative         Paresthesias: No.       CSF fluid: clear.    Medication(s) Administered   0.75% Hyperbaric Bupivacaine (Intrathecal) - Intrathecal   1.6 mL - 5/31/2024 1:13:00 PM  Morphine PF 1 mg/mL (Intrathecal) - Intrathecal   0.15 mg - 5/31/2024 1:13:00 PM  Fentanyl PF (Intrathecal) - Intrathecal   10 mcg - 5/31/2024 1:13:00 PM  Medication Administration Time: 5/31/2024 1:13 PM      FOR [facility] ([facility]) ONLY:   Pain Team Contact information: please page the Pain Team Via [employer]. Search \"Pain\". During daytime hours, please page the attending first. At night please page the resident first.      "

## 2024-05-31 NOTE — PROGRESS NOTES
Patient back to room 1420. Patient offered sips of water, and tolerated well. Offered crackers, patient declined at this time.

## 2024-05-31 NOTE — OP NOTE
Section Operative Note    NAME:  Tammie Odonnell     RECORD # [id number]     2024    DATE OF SERVICE: 2024     PREOPERATIVE DIAGNOSIS:   1) IUP at 40+1 weeks  2) Previous C/S x3    POSTOPERATIVE DIAGNOSIS:   1) IUP at 40+1 weeks  2) Previous C/S x3    PROCEDURE: Repeat Low transverse  section    SURGEON:  Cheri Romeo M.D.    ASSISTANT: R1    ANESTHESIA: Spinal    ESTIMATED BLOOD LOSS: 305 cc    DRAINS: Erwin catheter.    COMPLICATIONS: None    INDICATIONS:  Ms. Tammie Odonnell is a 34 year old year old who presents for a repeat  section.     FINDINGS:  Live male infant born with Apgars of 8 at one minute, 9 at 5 minutes,  Weight 6#9oz.  Normal tubes, ovaries, and pelvic organs. Minimal to no adhesions noted.     PROCEDURE:  Patient was met preoperatively where we discussed the procedure and the risks associated with the procedure.  She understood these to include but not limited to injury to adjacent organs including bowel, bladder, ureter, infection and bleeding. Understanding these risks her consents were signed.      After informed consent was obtained, the patient was taken to the operating room where she was given spinal anesthesia.  She was placed in the left lateral tilt position and prepped and draped in usual sterile fashion.  A timeout was undertaken.      A Pfannenstiel skin incision was made with the scalpel and taken down through the subcutaneous tissue.  The rectus fascia was identified and scored in the midline.  The rectus fascia was then opened bilaterally.  The rectus fascia was taken down from the underlying muscle superiorly and inferiorly.  The peritoneum was identified and entered sharply using scissors.  The bladder flap was created with the Metzenbaum scissors.  The bladder blade was inserted.    A low segment transverse uterine incision was made with a scalpel.  The uterine incision was opened bluntly with my fingers.  The infant was delivered from a vertex  position.  The head was delivered atraumatically.  The remainder of the body was delivered without issues.  The mouth and nares were suctioned.  The cord was doubly clamped and cut and the infant was handed to the pediatric team with the findings as noted above.  The placenta was then removed with gentle traction. Uterine cavity was wiped free of clot and debris. The uterine incision was closed using 0 Vicryl in a running locked fashion.  A second imbricating layer was placed using 0 Monocryl.   The gutters were irrigated. Hemostasis was achieved.      The peritoneum was closed using 3-0 Vicryl. The rectus muscles were hemostatic.  The rectus fascia was closed using 0 vicryl, starting at the lateral edges meeting in the midline.  Skin margins were re-approximated using the Insorb staplers.      Sponge and needle counts were reported to me as correct X 2.  Mother and infant are stable.      Cheri Romeo M.D.

## 2024-05-31 NOTE — ANESTHESIA PREPROCEDURE EVALUATION
Anesthesia Pre-Procedure Evaluation    Patient: Tammie Odonnell   MRN: [id number] : 1990        Procedure : Procedure(s):  REPEAT  SECTION          No past medical history on file.   Past Surgical History:   Procedure Laterality Date     C/SECTION, LOW TRANSVERSE        SECTION N/A 2021    Procedure: REPEAT  SECTION;  Surgeon: Alan Olson MD;  Location: [facility] OR      No Known Allergies   Social History     Tobacco Use     Smoking status: Never     Passive exposure: Never     Smokeless tobacco: Never   Substance Use Topics     Alcohol use: Never      Wt Readings from Last 1 Encounters:   24 78.7 kg (173 lb 6.4 oz)        Anesthesia Evaluation            ROS/MED HX  ENT/Pulmonary:  - neg pulmonary ROS     Neurologic:  - neg neurologic ROS     Cardiovascular:  - neg cardiovascular ROS     METS/Exercise Tolerance:     Hematologic:  - neg hematologic  ROS     Musculoskeletal: Comment: Reports some intermittent back pain after her CS in .       GI/Hepatic:  - neg GI/hepatic ROS     Renal/Genitourinary:  - neg Renal ROS     Endo:  - neg endo ROS     Psychiatric/Substance Use:  - neg psychiatric ROS     Infectious Disease:       Malignancy:       Other:      (+) Possibly pregnant, , ,       Physical Exam    Airway        Mallampati: II   TM distance: > 3 FB   Neck ROM: full   Mouth opening: > 3 cm    Respiratory Devices and Support         Dental       (+) Minor Abnormalities - some fillings, tiny chips      Cardiovascular   cardiovascular exam normal          Pulmonary   pulmonary exam normal            OUTSIDE LABS:  CBC:   Lab Results   Component Value Date    WBC 9.0 2024    WBC 5.2 2024    HGB 10.8 (L) 2024    HGB 12.9 2024    HCT 35.5 2024    HCT 39.2 2024     2024     2024     BMP:   Lab Results   Component Value Date     2024     2023    POTASSIUM 3.7 2024     "POTASSIUM 3.8 05/02/2023    CHLORIDE 103 02/12/2024    CHLORIDE 106 05/02/2023    CO2 21 (L) 02/12/2024    CO2 25 05/02/2023    BUN 4.6 (L) 02/12/2024    BUN 5.5 (L) 05/02/2023    CR 0.41 (L) 02/12/2024    CR 0.55 05/02/2023    GLC 69 (L) 02/12/2024    GLC 94 05/02/2023     COAGS: No results found for: \"PTT\", \"INR\", \"FIBR\"  POC:   Lab Results   Component Value Date    HCG Positive (A) 03/15/2021     HEPATIC:   Lab Results   Component Value Date    ALBUMIN 4.2 05/02/2023    PROTTOTAL 7.2 05/02/2023    ALT 16 05/02/2023    AST 25 05/02/2023    ALKPHOS 72 05/02/2023    BILITOTAL 0.5 05/02/2023     OTHER:   Lab Results   Component Value Date    A1C 5.2 05/02/2023    JOSEY 8.9 02/12/2024    TSH 3.05 05/02/2023       Anesthesia Plan    ASA Status:  2    NPO Status:  NPO Appropriate    Anesthesia Type: Spinal.              Consents    Anesthesia Plan(s) and associated risks, benefits, and realistic alternatives discussed. Questions answered and patient/representative(s) expressed understanding.     - Discussed: Risks, Benefits and Alternatives for BOTH SEDATION and the PROCEDURE were discussed     - Discussed with:  Patient      - Extended Intubation/Ventilatory Support Discussed: No.      - Patient is DNR/DNI Status: No     Use of blood products discussed: No .     Postoperative Care    Pain management: Oral pain medications, IV analgesics.   PONV prophylaxis: Ondansetron (or other 5HT-3), Dexamethasone or Solumedrol     Comments:    Other Comments: 150 mcg duramorph  10 mcg fentanyl  Consented for TAP blocks.          Destin Webb, DO    I have reviewed the pertinent notes and labs in the chart from the past 30 days and (re)examined the patient.  Any updates or changes from those notes are reflected in this note.                  "

## 2024-05-31 NOTE — H&P
"OB HISTORY AND PHYSICAL UPDATE ADMISSION EXAM    Darwin A Dorothy  1990  [id number]    HPI: 33 yo at 40+2 weeks who presents for a repeat  section. She has had 3 C/S in the past.     Estimated Date of Delivery: May 29, 2024                       EGA 40w2d    OB History    Para Term  AB Living   6 5 4 1 0 3   SAB IAB Ectopic Multiple Live Births   0 0 0 0 1      # Outcome Date GA Lbr Allen/2nd Weight Sex Type Anes PTL Lv   6 Current            5 Term 21 40w1d  2.81 kg (6 lb 3.1 oz) F CS-LTranv Spinal N ZAKIYA      Name: DOROTHY,FEMALE-DARWIN      Apgar1: 8  Apgar5: 9   4 Term            3 Term            2 Term            1                 Prenatal Complications: None    Exam:    /79 (BP Location: Left arm, Patient Position: Semi-Perkins's)   Temp 98.1  F (36.7  C) (Oral)   Resp 18   Ht 1.6 m (5' 3\")   Wt 79.4 kg (175 lb)   LMP 2023 (Approximate)   BMI 31.00 kg/m          HEENT          WNL              Heart              WNL               Lungs             WNL                      Abdomen        WNL                       Extremities     WNL                     Fetal Status    Category I    Assessment: For  section    Plan: Planned  section    Cheri Romeo M.D.  "

## 2024-05-31 NOTE — PROGRESS NOTES
Spoke with Dr. Ibarra, pt requesting for more medication for itching. Nubain was given at 1453 states still very itchy. Verbal orders for Benadryl.

## 2024-05-31 NOTE — ANESTHESIA CARE TRANSFER NOTE
Patient: Tammie Odonnell    Procedure: Procedure(s):  REPEAT  SECTION       Diagnosis: Uterine scar from previous surgery affecting pregnancy [O34.29]  Diagnosis Additional Information: No value filed.    Anesthesia Type:   Spinal     Note:    Oropharynx: oropharynx clear of all foreign objects and spontaneously breathing  Level of Consciousness: awake  Oxygen Supplementation: room air    Independent Airway: airway patency satisfactory and stable  Dentition: dentition unchanged  Vital Signs Stable: post-procedure vital signs reviewed and stable  Report to RN Given: handoff report given  Patient transferred to: Labor and Delivery    Handoff Report: Identifed the Patient, Identified the Reponsible Provider, Reviewed the pertinent medical history, Discussed the surgical course, Reviewed Intra-OP anesthesia mangement and issues during anesthesia, Set expectations for post-procedure period and Allowed opportunity for questions and acknowledgement of understanding  Vitals:  Vitals Value Taken Time   /55 24 1430   Temp     Pulse     Resp     SpO2 100 % 24 1432   Vitals shown include unfiled device data.    Electronically Signed By: YOHAN Parker CRNA  May 31, 2024  2:34 PM

## 2024-06-01 LAB — HGB BLD-MCNC: 9 G/DL (ref 11.7–15.7)

## 2024-06-01 PROCEDURE — 250N000013 HC RX MED GY IP 250 OP 250 PS 637: Performed by: OBSTETRICS & GYNECOLOGY

## 2024-06-01 PROCEDURE — 250N000011 HC RX IP 250 OP 636: Performed by: OBSTETRICS & GYNECOLOGY

## 2024-06-01 PROCEDURE — 120N000001 HC R&B MED SURG/OB

## 2024-06-01 PROCEDURE — 85018 HEMOGLOBIN: CPT | Performed by: OBSTETRICS & GYNECOLOGY

## 2024-06-01 PROCEDURE — 36415 COLL VENOUS BLD VENIPUNCTURE: CPT | Performed by: OBSTETRICS & GYNECOLOGY

## 2024-06-01 RX ADMIN — IBUPROFEN 800 MG: 800 TABLET ORAL at 10:11

## 2024-06-01 RX ADMIN — IBUPROFEN 800 MG: 800 TABLET ORAL at 17:19

## 2024-06-01 RX ADMIN — SENNOSIDES AND DOCUSATE SODIUM 2 TABLET: 8.6; 5 TABLET ORAL at 20:24

## 2024-06-01 RX ADMIN — KETOROLAC TROMETHAMINE 30 MG: 30 INJECTION, SOLUTION INTRAMUSCULAR at 04:04

## 2024-06-01 RX ADMIN — ACETAMINOPHEN 975 MG: 325 TABLET ORAL at 17:18

## 2024-06-01 RX ADMIN — OXYCODONE HYDROCHLORIDE 5 MG: 5 TABLET ORAL at 20:24

## 2024-06-01 RX ADMIN — ACETAMINOPHEN 975 MG: 325 TABLET ORAL at 10:11

## 2024-06-01 RX ADMIN — SENNOSIDES AND DOCUSATE SODIUM 2 TABLET: 8.6; 5 TABLET ORAL at 08:18

## 2024-06-01 RX ADMIN — OXYCODONE HYDROCHLORIDE 5 MG: 5 TABLET ORAL at 14:40

## 2024-06-01 ASSESSMENT — ACTIVITIES OF DAILY LIVING (ADL)
ADLS_ACUITY_SCORE: 21
ADLS_ACUITY_SCORE: 20
ADLS_ACUITY_SCORE: 20
ADLS_ACUITY_SCORE: 21
ADLS_ACUITY_SCORE: 21
ADLS_ACUITY_SCORE: 20
ADLS_ACUITY_SCORE: 20
ADLS_ACUITY_SCORE: 21
ADLS_ACUITY_SCORE: 20
ADLS_ACUITY_SCORE: 21
ADLS_ACUITY_SCORE: 20
ADLS_ACUITY_SCORE: 21
ADLS_ACUITY_SCORE: 20
ADLS_ACUITY_SCORE: 21

## 2024-06-01 NOTE — PROGRESS NOTES
"OB PROGRESS NOTE  Postpartum Day 1: Vaginal Delivery    Subjective:  Feels well. Pain well controlled.  Has no new complaints.  Has normal amount of lochia.  She has no emotional concerns. The patient is bottlefeeding the baby.    Objective:  /60 (BP Location: Right arm, Patient Position: Semi-Perkins's, Cuff Size: Adult Regular)   Pulse 75   Temp 98.9  F (37.2  C) (Oral)   Resp 19   Ht 1.6 m (5' 3\")   Wt 79.4 kg (175 lb)   LMP 2023 (Approximate)   SpO2 99%   Breastfeeding Unknown   BMI 31.00 kg/m    GENERAL APPEARANCE: A&O, NAD  MOOD: appropriate  ABDOMEN: soft, NT, ffu/0  EXTREMITIES: Symmetric, nontender, no edema    Assessment:  PPD#1 status post , doing well.    Plan:    Continue routine postpartum care.    Hilda Lewis MD 2024    "

## 2024-06-01 NOTE — PLAN OF CARE
Goal Outcome Evaluation:      Plan of Care Reviewed With: patient, spouse (family interpreting per patient request - same family memebers since admission)    Overall Patient Progress: improvingOverall Patient Progress: improving    Outcome Evaluation: she states she is happy with how her birth journey is going and is happy that she is no longer itching and that baby is calm

## 2024-06-01 NOTE — PLAN OF CARE
Problem: Adult Inpatient Plan of Care  Goal: Absence of Hospital-Acquired Illness or Injury  2024 1808 by Chikis Yates RN  Outcome: Progressing  Intervention: Prevent Skin Injury  Recent Flowsheet Documentation  Taken 2024 1615 by Chikis Yates RN  Body Position: position changed independently  Intervention: Prevent Infection  Recent Flowsheet Documentation  Taken 2024 1615 by Chikis Yates RN  Infection Prevention:   environmental surveillance performed   equipment surfaces disinfected   hand hygiene promoted   personal protective equipment utilized   rest/sleep promoted     Problem: Adult Inpatient Plan of Care  Goal: Optimal Comfort and Wellbeing  2024 1808 by Chikis Yates RN  Outcome: Progressing  Intervention: Monitor Pain and Promote Comfort  Recent Flowsheet Documentation  Taken 2024 1718 by Chikis Yates RN  Pain Management Interventions: medication (see MAR)  Taken 2024 1615 by Chikis Yates RN  Pain Management Interventions: rest  Taken 2024 1530 by Chikis Yates RN  Pain Management Interventions: rest  Intervention: Provide Person-Centered Care  Recent Flowsheet Documentation  Taken 2024 1615 by Chikis Yates RN  Trust Relationship/Rapport:   care explained   choices provided   empathic listening provided   questions answered   questions encouraged   reassurance provided     Problem: Postpartum ( Delivery)  Goal: Successful Parent Role Transition  2024 180 by Chikis Yates RN  Outcome: Progressing  Intervention: Support Parent Role Transition  Recent Flowsheet Documentation  Taken 2024 1615 by Chikis Yates RN  Supportive Measures:   active listening utilized   goal-setting facilitated   decision-making supported   self-care encouraged   self-reflection promoted   self-responsibility promoted  Parent-Child Attachment Promotion:   caring behavior modeled   cue recognition promoted   interaction encouraged   rooming-in promoted   skin-to-skin  contact encouraged   strengths emphasized   parent/caregiver presence encouraged     Problem: Postpartum ( Delivery)  Goal: Effective Bowel Elimination  2024 1808 by Chikis Yates RN  Outcome: Progressing  Intervention: Enhance Bowel Motility and Elimination  Recent Flowsheet Documentation  Taken 2024 1615 by Chikis Yates RN  Bowel Motility Enhancement: ambulation promoted  Bowel Elimination Promotion:   adequate fluid intake promoted   ambulation promoted     Problem: Postpartum ( Delivery)  Goal: Optimal Pain Control and Function  2024 1808 by Chikis Yates RN  Outcome: Progressing  Intervention: Prevent or Manage Pain  Recent Flowsheet Documentation  Taken 2024 1718 by Chikis Yates RN  Pain Management Interventions: medication (see MAR)  Taken 2024 1615 by hCikis aYtes RN  Pain Management Interventions: rest  Taken 2024 1530 by Chikis Yates RN  Pain Management Interventions: rest     Problem: Breastfeeding  Goal: Effective Breastfeeding  2024 1808 by Chikis Yates RN  Outcome: Progressing  Intervention: Promote Effective Breastfeeding  Recent Flowsheet Documentation  Taken 2024 1615 by Chikis Yates RN  Parent-Child Attachment Promotion:   caring behavior modeled   cue recognition promoted   interaction encouraged   rooming-in promoted   skin-to-skin contact encouraged   strengths emphasized   parent/caregiver presence encouraged  Intervention: Support Exclusive Breastfeeding Success  Recent Flowsheet Documentation  Taken 2024 1615 by Chikis Yates RN  Supportive Measures:   active listening utilized   goal-setting facilitated   decision-making supported   self-care encouraged   self-reflection promoted   self-responsibility promoted     Patient assessments and vitals are WDL. Pt denies headache, visual changes, dizziness, and epigastric pain. Fundus firm without massage. Dressing is marked with no change. Pain has been adequately controlled by  tylenol, ibuprofen, and oxycodone. Spouse is at bedside and supportive of pt. Pt and spouse are attentive to infant and active in cares. Pt is breast and formula feeding baby q2-3 hours. Educated on amounts and paced bottle feeding - parents verbalized understanding. Infant spit up this morning, but after pacing and decreased amounts per feed, infant did not spit up throughout the day. Educated parents to watch for infant cues - so if infant eats 15mL, but in 30 minutes instead of 2 hours is still cuing for more, it's okay to give more. Patient is up ad christiano. Tolerating fluids and foods - voiding without difficulty.

## 2024-06-01 NOTE — LACTATION NOTE
Initial Lactation Visit    Hours since Delivery: 1 day 2 hours old    Gestational Age at Delivery: 40w2d     Diaper Count: 5 wet 6 soiled     Breastfeeding goals:breast + formula    Past breastfeeding experience: 4th baby- breast and formula    Maternal health risk that may affect breastfeeding:AMA    Breast Assessment: NA     Feeding Assessment: Tammie reports breastfeeding is going well, declined support with latching at this time.   Would like to start pumping after next feeding, floor nurse aware.      Feeding Plan: offering breast and supplementing with formula     Education:   [] Expected  feeding patterns in the first few days (pg. 38 of Your Guide to To Postpartum and  Care)/ the Second Night  [] Stages of milk production  [] Benefits of hand expression of colostrum  [] Early feeding cues     [] Benefits of skin to skin  [] Breastfeeding positions  [] Tips to get and maintain a deep latch  [] Nutritive vs.non-nutritive sucking  [] Gentle breast compressions as needed to enhance milk transfer  [] How to tell when baby is finished  [] Expected  output  [] [place] weight loss  [] Infant Feeding Log  [] Signs breastfeeding is going well (comfortable latch, audible swallows, age appropriate output and weight loss)    [] Engorgement  [] Reverse Pressure Softening  [x] Pumping recommendations (based on patient need)  [] Inpatient breastfeeding support  [] Outpatient lactation resources    Follow up: Will follow up

## 2024-06-01 NOTE — PLAN OF CARE
Problem: Adult Inpatient Plan of Care  Goal: Absence of Hospital-Acquired Illness or Injury  Outcome: Progressing  Intervention: Prevent Skin Injury  Recent Flowsheet Documentation  Taken 2024 by Chikis Yates RN  Body Position: position changed independently  Intervention: Prevent Infection  Recent Flowsheet Documentation  Taken 2024 by Chikis Yates RN  Infection Prevention:   environmental surveillance performed   equipment surfaces disinfected   hand hygiene promoted   personal protective equipment utilized   rest/sleep promoted     Problem: Adult Inpatient Plan of Care  Goal: Optimal Comfort and Wellbeing  Outcome: Progressing  Intervention: Monitor Pain and Promote Comfort  Recent Flowsheet Documentation  Taken 2024 by Chikis Yates RN  Pain Management Interventions: rest  Intervention: Provide Person-Centered Care  Recent Flowsheet Documentation  Taken 2024 by Chikis Yates RN  Trust Relationship/Rapport:   care explained   choices provided   empathic listening provided   questions answered   questions encouraged   reassurance provided     Problem: Postpartum ( Delivery)  Goal: Successful Parent Role Transition  Outcome: Progressing  Intervention: Support Parent Role Transition  Recent Flowsheet Documentation  Taken 2024 by Chikis Yates RN  Supportive Measures:   active listening utilized   goal-setting facilitated   decision-making supported   self-care encouraged   self-reflection promoted   self-responsibility promoted  Parent-Child Attachment Promotion:   caring behavior modeled   cue recognition promoted   interaction encouraged   rooming-in promoted   skin-to-skin contact encouraged   strengths emphasized   parent/caregiver presence encouraged     Problem: Postpartum ( Delivery)  Goal: Optimal Pain Control and Function  Outcome: Progressing  Intervention: Prevent or Manage Pain  Recent Flowsheet Documentation  Taken 2024 by  Chikis Yates RN  Pain Management Interventions: rest     Problem: Postpartum ( Delivery)  Goal: Effective Urinary Elimination  Outcome: Progressing     Problem: Breastfeeding  Goal: Effective Breastfeeding  Outcome: Progressing  Intervention: Promote Effective Breastfeeding  Recent Flowsheet Documentation  Taken 2024 0815 by Chikis Yates, RN  Parent-Child Attachment Promotion:   caring behavior modeled   cue recognition promoted   interaction encouraged   rooming-in promoted   skin-to-skin contact encouraged   strengths emphasized   parent/caregiver presence encouraged  Intervention: Support Exclusive Breastfeeding Success  Recent Flowsheet Documentation  Taken 2024 0815 by Chikis Yates, RN  Supportive Measures:   active listening utilized   goal-setting facilitated   decision-making supported   self-care encouraged   self-reflection promoted   self-responsibility promoted     Patient assessments and vitals are WDL. Mom requested to utilize  for interpreting during cares,  verbalized okay with doing so. Pt denies headache, visual changes, dizziness, and epigastric pain. Fundus firm without massage. Incisional dressing is marked - extended possibly from last time as RN was unable to see where the last ajay was clearly. Pain has been adequately controlled by tylenol and ibuprofen as scheduled. Spouse is at bedside, supportive of pt, and interpreting for patient. Patient and spouse are attentive to infant cues, holding infant, and active in cares. Pt is being breast and formula fed - spouse stated they have been paced bottle feeding - RN reinforced this and burping. Pt states she has been placing baby to breast and is going well - needs help and reinforcement about skin to skin and positioning. Patient is up ad christiano. Tolerating fluids and foods - voiding without difficulty. Turned in Birth certificate and PPMA - 0.

## 2024-06-02 VITALS
WEIGHT: 175 LBS | HEIGHT: 63 IN | BODY MASS INDEX: 31.01 KG/M2 | OXYGEN SATURATION: 100 % | DIASTOLIC BLOOD PRESSURE: 64 MMHG | SYSTOLIC BLOOD PRESSURE: 107 MMHG | RESPIRATION RATE: 18 BRPM | TEMPERATURE: 97.8 F | HEART RATE: 89 BPM

## 2024-06-02 PROCEDURE — 250N000013 HC RX MED GY IP 250 OP 250 PS 637: Performed by: OBSTETRICS & GYNECOLOGY

## 2024-06-02 RX ORDER — IBUPROFEN 800 MG/1
800 TABLET, FILM COATED ORAL EVERY 6 HOURS
Qty: 30 TABLET | Refills: 0 | Status: SHIPPED | OUTPATIENT
Start: 2024-06-02 | End: 2024-08-20

## 2024-06-02 RX ORDER — DOCUSATE SODIUM 100 MG/1
100 CAPSULE, LIQUID FILLED ORAL 2 TIMES DAILY
Qty: 60 CAPSULE | Refills: 1 | Status: SHIPPED | OUTPATIENT
Start: 2024-06-02 | End: 2024-08-20

## 2024-06-02 RX ORDER — OXYCODONE HYDROCHLORIDE 5 MG/1
5 TABLET ORAL EVERY 6 HOURS PRN
Qty: 12 TABLET | Refills: 0 | Status: SHIPPED | OUTPATIENT
Start: 2024-06-02 | End: 2024-08-20

## 2024-06-02 RX ADMIN — IBUPROFEN 800 MG: 800 TABLET ORAL at 06:05

## 2024-06-02 RX ADMIN — IBUPROFEN 800 MG: 800 TABLET ORAL at 12:13

## 2024-06-02 RX ADMIN — ACETAMINOPHEN 975 MG: 325 TABLET ORAL at 12:13

## 2024-06-02 RX ADMIN — SENNOSIDES AND DOCUSATE SODIUM 2 TABLET: 8.6; 5 TABLET ORAL at 08:30

## 2024-06-02 RX ADMIN — OXYCODONE HYDROCHLORIDE 5 MG: 5 TABLET ORAL at 12:17

## 2024-06-02 RX ADMIN — ACETAMINOPHEN 975 MG: 325 TABLET ORAL at 00:13

## 2024-06-02 RX ADMIN — ACETAMINOPHEN 975 MG: 325 TABLET ORAL at 06:04

## 2024-06-02 RX ADMIN — IBUPROFEN 800 MG: 800 TABLET ORAL at 00:13

## 2024-06-02 ASSESSMENT — ACTIVITIES OF DAILY LIVING (ADL)
ADLS_ACUITY_SCORE: 20

## 2024-06-02 NOTE — LACTATION NOTE
Tammie reported feeding are going well, she requested a breast   pump.       Lactation distributed  mother a breast pump from [facility].  Mother was instructed on the use and cleaning of the breast pump.  Mother verbalizes understanding of how to use the breast pump.  She was instructed to empty her breasts 8-12 times in 24 hours, either with the baby at the breast or the breast pump, if she wants to make milk for her .

## 2024-06-02 NOTE — PLAN OF CARE
Problem: Adult Inpatient Plan of Care  Goal: Readiness for Transition of Care  Outcome: Met     Patient assessments and vitals are WDL. Spouse is supportive of pt. Birth certificate and PPMA are done.  interprets per mom's request. Mother has been breast and formula feeding infant. Sold breast pump. Weight obtained. Educated patient on medications, worsening symptoms, and follow-up care. Pt verbalized understanding with no further questions.

## 2024-06-02 NOTE — DISCHARGE SUMMARY
HOSPITAL DISCHARGE SUMMARY -  Birth    Patient Name: Tammie Odonnell   YOB: 1990  Age: 34 year old  Medical Record Number: [id number]  Primary Physician: Alyse Ortega    Admission Date:  2024  Delivery Date:   2024  Gestational Age at Delivery:  40w2d   Discharge Date:  2024    REASON FOR ADMISSION: Labor and Delivery    DIAGNOSIS:    1.  Birth secondary to repeat  2. APGARS at 1 min 8, at 5 min 9  3. Baby's Weight 6 lbs 9.5oz  4. Acute blood loss anemia      Conditions complicating antepartum/postpartum:      PROCEDURES:  Lower transverse     SIGNIFICANT DIAGNOSTIC PROCEDURES:   none    CONSULTS: none    HISTORY OF PRESENT ILLNESS AND HOSPITAL COURSE: This is a 34 year old   female who underwent  section without complication secondary to repeat . Postoperative course was unremarkable. On the day of discharge patient was tolerating diet, pain was controlled with oral medications, she was voiding and passing gas.    LABS:  Lab Results   Component Value Date    HGB 9.0 (L) 2024     Pre-operative hgb : 11.0      PENDING LABS:  none    DISPOSITION:  Home    DISCHARGE CONDITION: Good/Stable    DISCHARGE MEDICATIONS:      Review of your medicines        UNREVIEWED medicines. Ask your doctor about these medicines        Dose / Directions   prenatal multivitamin w/iron 27-0.8 MG tablet      Dose: 1 tablet  Take 1 tablet by mouth daily at 2 pm  Refills: 0              DISCHARGE PLAN:   - Follow up with  Dr. Romeo, in 2wks  - Take medication as prescribed  - Physical activity: As tolerated, no heavy lifting. Pelvic rest.  - Diet:  Regular  - Medication:  Please see MAR  - Warning signs discussed with patient about when to call the clinic/hospital  - All questions and concerns were answered for the patient prior to discharge.         Jackie Mendoza,  on 2024 at 8:29 AM      I saw the patient on the date of discharge  Total time spent for  discharge on date of discharge: 20 minutes    Physician(s) in addition to primary physician who should receive a copy:  CC1: Dr. CAL Romeo

## 2024-06-02 NOTE — PLAN OF CARE
"Goal Outcome Evaluation:      Plan of Care Reviewed With: patient, spouse    Overall Patient Progress: improvingOverall Patient Progress: improving    Outcome Evaluation: she admits \" this is hard to do, this all hurts\", we reviewed her pain control plan and she has utilized scheduled and prn pain medications. RN has brought up removing bandage with her and she wants this to happen once pain is better controlled. she has been up walking in the unit hallways this shift and has had a bowel movement per patient.      "

## 2024-06-02 NOTE — DISCHARGE INSTRUCTIONS
Warning Signs after Having a Baby    Keep this paper on your fridge or somewhere else where you can see it.    Call your provider if you have any of these symptoms up to 12 weeks after having your baby.    Thoughts of hurting yourself or your baby  Pain in your chest or trouble breathing  Severe headache not helped by pain medicine  Eyesight concerns (blurry vision, seeing spots or flashes of light, other changes to eyesight)  Fainting, shaking or other signs of a seizure    Call 9-1-1 if you feel that it is an emergency.     The symptoms below can happen to anyone after giving birth. They can be very serious. Call your provider if you have any of these warning signs.    My provider s phone number: _______________________    Losing too much blood (hemorrhage)    Call your provider if you soak through a pad in less than an hour or pass blood clots bigger than a golf ball. These may be signs that you are bleeding too much.    Blood clots in the legs or lungs    After you give birth, your body naturally clots its blood to help prevent blood loss. Sometimes this increased clotting can happen in other areas of the body, like the legs or lungs. This can block your blood flow and be very dangerous.     Call your provider if you:  Have a red, swollen spot on the back of your leg that is warm or painful when you touch it.   Are coughing up blood.     Infection    Call your provider if you have any of these symptoms:  Fever of 100.4 F (38 C) or higher.  Pain or redness around your stitches if you had an incision.   Any yellow, white, or green fluid coming from places where you had stitches or surgery.    Mood Problems (postpartum depression)    Many people feel sad or have mood changes after having a baby. But for some people, these mood swings are worse.     Call your provider right away if you feel so anxious or nervous that you can't care for yourself or your baby.    Preeclampsia (high blood pressure)    Even if you  didn't have high blood pressure when you were pregnant, you are at risk for the high blood pressure disease called preeclampsia. This risk can last up to 12 weeks after giving birth.     Call your provider if you have:   Pain on your right side under your rib cage  Sudden swelling in the hands and face    Remember: You know your body. If something doesn't feel right, get medical help.     For informational purposes only. Not to replace the advice of your health care provider. Copyright 2020 [facility]. All rights reserved. Clinically reviewed by Carolina Jensen, RNC-OB, MSN. [employer] [id number] - Rev 02/23.

## 2024-06-02 NOTE — ANESTHESIA POSTPROCEDURE EVALUATION
Patient: Tammie Odonnell    Procedure: Procedure(s):  REPEAT  SECTION       Anesthesia Type:  Spinal    Note:  Disposition: Admission; Inpatient   Postop Pain Control: Uneventful            Sign Out: Well controlled pain   PONV: No   Neuro/Psych: Uneventful            Sign Out: Acceptable/Baseline neuro status   Airway/Respiratory: Uneventful            Sign Out: Acceptable/Baseline resp. status   CV/Hemodynamics: Uneventful            Sign Out: Acceptable CV status; No obvious hypovolemia; No obvious fluid overload   Other NRE: NONE   DID A NON-ROUTINE EVENT OCCUR? No    Event details/Postop Comments:  No apparent anesthesia complications per chart review.           Last vitals:  Vitals:    24 1615 24 0016 24 0830   BP:  125/80 107/64   Pulse: 74 89    Resp:  18 18   Temp:  37.1  C (98.7  F) 36.6  C (97.8  F)   SpO2:  100% 100%       Electronically Signed By: Antonieta Dominguez MD  2024  11:44 AM

## 2024-06-02 NOTE — PROGRESS NOTES
"Postpartum Day 2:     Subjective:  The patient feels well. The patient has no emotional concerns. Pain is well controlled with current medications. The patient is ambulating well. She is voiding and passing gas.The amount and color of the lochia is appropriate for the duration of recovery, patient denies clots. The baby is well.    Objective   The patient has a blood pressure which is within the normal range.Urinary output is adequate.       Exam:   /80 (BP Location: Right arm, Patient Position: Semi-Perkins's, Cuff Size: Adult Regular)   Pulse 89   Temp 98.7  F (37.1  C) (Oral)   Resp 18   Ht 1.6 m (5' 3\")   Wt 79.4 kg (175 lb)   LMP 2023 (Approximate)   SpO2 100%   Breastfeeding Unknown   BMI 31.00 kg/m  .  General: NAD  Abdomen: soft, NT   Incision: C/D/I  Fundus: firm, nontender  Ext: no pain       Impression:   Normal postpartum course. POD#2 LTCS secondary to previous CS    Plan:   - Continue current care.  - Doing well  - DC Home   - Follow up in 1-2 weeks for incisional check     Jackie Mendoza DO on 2024 at 8:12 AM    "

## 2024-06-03 ENCOUNTER — PATIENT OUTREACH (OUTPATIENT)
Dept: FAMILY MEDICINE | Facility: CLINIC | Age: 34
End: 2024-06-03
Payer: COMMERCIAL

## 2024-06-03 NOTE — TELEPHONE ENCOUNTER
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 No concerns at this time.  How are your symptoms? (Red Flag symptoms escalate to triage hotline per guidelines): Improved  Do you know how to contact your clinic care team if you have future questions or changes to your health status? : Yes  Does the patient have their discharge instructions? : Yes  Does the patient have questions regarding their discharge instructions? : No  Were you started on any new medications or were there changes to any of your previous medications? : Yes  Does the patient have all of their medications?: Yes  Do you have questions regarding any of your medications? : No  Do you have all of your needed medical supplies or equipment (DME)?  (i.e. oxygen tank, CPAP, cane, etc.): Yes    Follow up Plan     Discharge Follow-Up  Discharge follow up appointment scheduled in alignment with recommended follow up timeframe or Transitions of Risk Category? (Low = within 30 days; Moderate= within 14 days; High= within 7 days): No  Patient's follow up appointment not scheduled: Patient declined scheduling support. Education on the importance of transitions of care follow up. Provided scheduling phone number.    No future appointments.    Outpatient Plan as outlined on AVS reviewed with patient.    For any urgent concerns, please contact our 24 hour nurse triage line: [phone] ([phone])       Brooke Hester RN    Pt states she is  doing well at home and denies any concerns at this time. Writer offered an appt with PCP for follow up. Pt declined at this time of call. Pt states she will follow up with her OB and reach back out to clinic for appt as needed. Pt thanked for the call.

## 2024-07-06 ENCOUNTER — OFFICE VISIT (OUTPATIENT)
Dept: FAMILY MEDICINE | Facility: CLINIC | Age: 34
End: 2024-07-06
Payer: COMMERCIAL

## 2024-07-06 ENCOUNTER — HOSPITAL ENCOUNTER (OUTPATIENT)
Dept: GENERAL RADIOLOGY | Facility: HOSPITAL | Age: 34
Discharge: HOME | End: 2024-07-06
Attending: NURSE PRACTITIONER | Admitting: NURSE PRACTITIONER
Payer: COMMERCIAL

## 2024-07-06 VITALS
DIASTOLIC BLOOD PRESSURE: 70 MMHG | HEART RATE: 91 BPM | WEIGHT: 175 LBS | SYSTOLIC BLOOD PRESSURE: 109 MMHG | RESPIRATION RATE: 18 BRPM | TEMPERATURE: 98.2 F | BODY MASS INDEX: 31 KG/M2 | OXYGEN SATURATION: 100 %

## 2024-07-06 DIAGNOSIS — R10.12 LEFT UPPER QUADRANT PAIN: ICD-10-CM

## 2024-07-06 DIAGNOSIS — Z86.2 HISTORY OF IRON DEFICIENCY ANEMIA: ICD-10-CM

## 2024-07-06 DIAGNOSIS — R10.12 LEFT UPPER QUADRANT PAIN: Primary | ICD-10-CM

## 2024-07-06 PROCEDURE — 99213 OFFICE O/P EST LOW 20 MIN: CPT | Performed by: NURSE PRACTITIONER

## 2024-07-06 PROCEDURE — 74019 RADEX ABDOMEN 2 VIEWS: CPT

## 2024-07-06 RX ORDER — SWAB
1 SWAB, NON-MEDICATED MISCELLANEOUS DAILY
Qty: 90 TABLET | Refills: 3 | Status: SHIPPED | OUTPATIENT
Start: 2024-07-06

## 2024-07-06 NOTE — PATIENT INSTRUCTIONS
Push  at least 64 oz of non-alcoholic/caffeinated fluids daily.  Make sure you are getting 20-30 grams of fiber in your diet or taking fiber supplement (Citrucel/Metamucil once or twice daily/Benefiber 2-3 times daily/fiber gummies daily)  If still constipated, add Colace (stool softener over the counter) 100 mg once or twice daily.  You can even increase up to 2 capsules twice daily.  If still constipated, add Miralax 1 capful in 8 oz of fluids as needed.  This can be taken every other day to keep stools regular and you can even increase up to daily, twice daily, 3 times daily or max of 4 times daily for regular bowel movements.  I refilled you with the prenatal vitamin with iron.    If you want, you can take the miralax twice daily as above instructions to help move stool through to feel better.  Follow-up if not seeing any improvement over the next week.

## 2024-07-06 NOTE — PROGRESS NOTES
Assessment & Plan     Left upper quadrant pain  DDx is constipation, shingles, or muscle strain.  XR of abdomen does show some stool burden and she has hx of constipation.  Advised conservative treatment with fluids, fiber, stool softener and miralax as needed.  Follow-up if not seeing improvement.  Advised patient that pain medication is not indicated since this could make constipation worse.  - XR Abdomen 2 Views; Future    History of iron deficiency anemia  Filled prenatal vitamin on recommendation and with iron due to hx of MARK.  Advised to change to OTC one a day vitamin if iron is hard on the stomach.  - Prenatal Vit-Fe Fumarate-FA (PRENATAL MULTIVITAMIN  WITH IRON) 28-0.8 MG TABS; Take 1 tablet by mouth daily    See Patient Instructions    Return if symptoms worsen or fail to improve.    Subjective   Tammie is a 34 year old, presenting for the following health issues:  Chest Pain (2 days pain under left rib,shoulder and back.)    HPI     Onset of symptoms was 2 day(s) ago.  Course of illness is worsening.    Severity moderate  Current and Associated symptoms:  abdominal pain LUQ and in the back and to the shoulder on the left  Aggravating factors: movement.    Alleviating factors:acetaminophen helped only a little  Diarrhea: No  Stools: No change   Vomitting: No  Appetite: normal  Risk factors: none    Patient presents with pain under her left rib and radiates to the back and posterior shoulder.  It started in the middle of the night with mild pain and now is worsening.  States it is about 6/10.  It is achy. It can get worse with movement or lifting her son.  No known injury.  Denies any constipation or diarrhea.  Last stool was at 11 am.  No nausea or vomiting.  Movement is the only thing that worsens it.  Tylenol helps a little.    Review of Systems  CONSTITUTIONAL: NEGATIVE for fever, chills, change in weight  RESP: NEGATIVE for significant cough or SOB  CV: NEGATIVE for chest pain, palpitations or  peripheral edema  GI: POSITIVE for abdominal pain LUQ  PSYCHIATRIC: NEGATIVE for changes in mood or affect  ROS otherwise negative      Objective    /70 (BP Location: Right arm, Patient Position: Sitting, Cuff Size: Adult Regular)   Pulse 91   Temp 98.2  F (36.8  C) (Oral)   Resp 18   Wt 79.4 kg (175 lb)   LMP 09/07/2023 (Approximate)   SpO2 100%   Breastfeeding No   BMI 31.00 kg/m    Body mass index is 31 kg/m .  Physical Exam   GENERAL: alert and no distress  ABDOMEN: tenderness lateral left upper quadrant  PSYCH: mentation appears normal, affect normal/bright      Signed Electronically by: Jovita Cortes, NP

## 2024-07-20 ENCOUNTER — HEALTH MAINTENANCE LETTER (OUTPATIENT)
Age: 34
End: 2024-07-20

## 2024-07-25 ENCOUNTER — APPOINTMENT (OUTPATIENT)
Dept: INTERPRETER SERVICES | Facility: CLINIC | Age: 34
End: 2024-07-25
Payer: COMMERCIAL

## 2024-07-25 ENCOUNTER — TELEPHONE (OUTPATIENT)
Dept: FAMILY MEDICINE | Facility: CLINIC | Age: 34
End: 2024-07-25
Payer: COMMERCIAL

## 2024-07-25 NOTE — TELEPHONE ENCOUNTER
General Call    Contacts       Contact Date/Time Type Contact Phone/Fax    07/25/2024 02:28 PM CDT Phone (Incoming) Belle Tammie EDGAR (Self) [phone] (M)          Reason for Call:  Pt needs an appt for a check up but first available appt with Dr. Ortega isn't until 10/28. Can you call pt if she can be seen sooner? Thank you!    Could we send this information to you in [employer] or would you prefer to receive a phone call?:   Patient would prefer a phone call   Okay to leave a detailed message?: Yes at [phone]

## 2024-07-25 NOTE — TELEPHONE ENCOUNTER
Called pt with [facility] .     Pt states since her C section she has been having pain at her epidural site and was told by surgeon to see PCP for follow up. Pt states also needs her annual appointment.     Writer informed we usually schedule these as two different appointments, pt states understanding.

## 2024-08-19 ENCOUNTER — LAB REQUISITION (OUTPATIENT)
Dept: LAB | Facility: CLINIC | Age: 34
End: 2024-08-19
Payer: COMMERCIAL

## 2024-08-19 DIAGNOSIS — Z11.3 ENCOUNTER FOR SCREENING FOR INFECTIONS WITH A PREDOMINANTLY SEXUAL MODE OF TRANSMISSION: ICD-10-CM

## 2024-08-19 PROCEDURE — 87491 CHLMYD TRACH DNA AMP PROBE: CPT | Mod: ORL | Performed by: OBSTETRICS & GYNECOLOGY

## 2024-08-20 ENCOUNTER — OFFICE VISIT (OUTPATIENT)
Dept: FAMILY MEDICINE | Facility: CLINIC | Age: 34
End: 2024-08-20
Payer: COMMERCIAL

## 2024-08-20 VITALS
WEIGHT: 174 LBS | SYSTOLIC BLOOD PRESSURE: 114 MMHG | HEART RATE: 77 BPM | BODY MASS INDEX: 30.82 KG/M2 | DIASTOLIC BLOOD PRESSURE: 66 MMHG | RESPIRATION RATE: 18 BRPM | OXYGEN SATURATION: 99 % | TEMPERATURE: 97.9 F

## 2024-08-20 DIAGNOSIS — M62.89 PELVIC FLOOR DYSFUNCTION: ICD-10-CM

## 2024-08-20 DIAGNOSIS — M54.16 LUMBAR BACK PAIN WITH RADICULOPATHY AFFECTING LEFT LOWER EXTREMITY: Primary | ICD-10-CM

## 2024-08-20 DIAGNOSIS — E55.9 VITAMIN D DEFICIENCY: ICD-10-CM

## 2024-08-20 DIAGNOSIS — D50.0 IRON DEFICIENCY ANEMIA DUE TO CHRONIC BLOOD LOSS: ICD-10-CM

## 2024-08-20 LAB
C TRACH DNA SPEC QL PROBE+SIG AMP: NEGATIVE
ERYTHROCYTE [DISTWIDTH] IN BLOOD BY AUTOMATED COUNT: 18.1 % (ref 10–15)
FERRITIN SERPL-MCNC: 12 NG/ML (ref 6–175)
HCT VFR BLD AUTO: 39.4 % (ref 35–47)
HGB BLD-MCNC: 12.2 G/DL (ref 11.7–15.7)
MCH RBC QN AUTO: 25.8 PG (ref 26.5–33)
MCHC RBC AUTO-ENTMCNC: 31 G/DL (ref 31.5–36.5)
MCV RBC AUTO: 84 FL (ref 78–100)
N GONORRHOEA DNA SPEC QL NAA+PROBE: NEGATIVE
PLATELET # BLD AUTO: 242 10E3/UL (ref 150–450)
RBC # BLD AUTO: 4.72 10E6/UL (ref 3.8–5.2)
WBC # BLD AUTO: 6 10E3/UL (ref 4–11)

## 2024-08-20 PROCEDURE — 36415 COLL VENOUS BLD VENIPUNCTURE: CPT | Performed by: STUDENT IN AN ORGANIZED HEALTH CARE EDUCATION/TRAINING PROGRAM

## 2024-08-20 PROCEDURE — 85027 COMPLETE CBC AUTOMATED: CPT | Performed by: STUDENT IN AN ORGANIZED HEALTH CARE EDUCATION/TRAINING PROGRAM

## 2024-08-20 PROCEDURE — G2211 COMPLEX E/M VISIT ADD ON: HCPCS | Performed by: STUDENT IN AN ORGANIZED HEALTH CARE EDUCATION/TRAINING PROGRAM

## 2024-08-20 PROCEDURE — 99214 OFFICE O/P EST MOD 30 MIN: CPT | Performed by: STUDENT IN AN ORGANIZED HEALTH CARE EDUCATION/TRAINING PROGRAM

## 2024-08-20 PROCEDURE — 82728 ASSAY OF FERRITIN: CPT | Performed by: STUDENT IN AN ORGANIZED HEALTH CARE EDUCATION/TRAINING PROGRAM

## 2024-08-20 RX ORDER — PREDNISONE 20 MG/1
40 TABLET ORAL DAILY
Qty: 10 TABLET | Refills: 0 | Status: SHIPPED | OUTPATIENT
Start: 2024-08-20 | End: 2024-08-25

## 2024-08-20 RX ORDER — GABAPENTIN 300 MG/1
300 CAPSULE ORAL AT BEDTIME
Qty: 90 CAPSULE | Refills: 0 | Status: SHIPPED | OUTPATIENT
Start: 2024-08-20

## 2024-08-20 RX ORDER — NAPROXEN 500 MG/1
500 TABLET ORAL 2 TIMES DAILY WITH MEALS
Qty: 14 TABLET | Refills: 0 | Status: SHIPPED | OUTPATIENT
Start: 2024-08-20

## 2024-08-20 ASSESSMENT — ENCOUNTER SYMPTOMS: BACK PAIN: 1

## 2024-08-20 ASSESSMENT — PAIN SCALES - GENERAL: PAINLEVEL: SEVERE PAIN (6)

## 2024-08-20 NOTE — PROGRESS NOTES
Assessment & Plan      Diagnosis Comments   1. Lumbar back pain with radiculopathy affecting left lower extremity  predniSONE (DELTASONE) 20 MG tablet, Physical Therapy  Referral, naproxen (NAPROSYN) 500 MG tablet, gabapentin (NEURONTIN) 300 MG capsule       2. Pelvic floor dysfunction  Physical Therapy  Referral       3. Iron deficiency anemia due to chronic blood loss  Ferritin, CBC with platelets       4. Vitamin D deficiency  calcium carbonate-vitamin D (OSCAL) 500-5 MG-MCG tablet           Left lower back pain with radiculopathy:  Pelvic floor dysfunction  Vitals are stable, no pinpoint bony tenderness along the lumbar spine.  Has hypertonic quadratus lumborum muscles on the left.  Reassured patient that her symptoms are not necessarily related to the epidural or repeat C-sections that she has had in the past.  She has lumbar radiculopathy 2/II nerve impingement.  Etiology of the nerve impingement could be bony, due to disc herniation, arthritis (although patient is quite young) or muscular.  Patient feels like her symptoms are largely muscular and is not interested in lumbar imaging today.  Patient is not breast-feeding. Will treat with prednisone burst, gabapentin nightly and Naprosyn burst. I suspect that patient also has an aspect of pelvic floor dysfunction and poor abdominal tone that is contributing to her low back pain.  Will send to PT.  Will see her back in a few weeks and if no improvement, will opt for MRI of the lumbar spine.    Iron deficiency anemia:  Noted during pregnancy  Last hemoglobin on file was downtrending.  Will recheck CBC and iron levels today.    The longitudinal plan of care for the diagnosis(es)/condition(s) as documented were addressed during this visit. Due to the added complexity in care, I will continue to support Tammie in the subsequent management and with ongoing continuity of care.          Subjective   Tammie is a 34 year old, presenting for the following  health issues:  Back Pain        2024    10:01 AM   Additional Questions   Roomed by christopher Rodriguez line is used for this interaction     Is here for back pain that travels down her left leg   Thinks it is related to the epidural she got during delivery or repeat    The pain start about 20 days after delivery and has not happened before  Left low back pain starts in her buttock and goes down the entirety of her left leg and into her heel  Occurs sometimes with sitting and sometimes with walking, standing.  Unable to walk for long periods of time.  No specific pattern or triggers identified  Pain is shooting and achy   Is not necessarily progressing but not going away   Has tried hot showers + tylenol - helps somewhat   No urinary incontinence, fecal incontinence or saddle anesthesia  No falls with this   Breastfeeding: no   Has had several C-sections and children.  Is quite sedentary.    Review of Systems  As per HPI       Objective    /66 (BP Location: Right arm, Patient Position: Sitting)   Pulse 77   Temp 97.9  F (36.6  C) (Oral)   Resp 18   Wt 78.9 kg (174 lb)   LMP 2023 (Approximate)   SpO2 99%   BMI 30.82 kg/m    Body mass index is 30.82 kg/m .  Physical Exam   GENERAL: alert and no distress  RESP: lungs clear to auscultation - no rales, rhonchi or wheezes  CV: regular rate and rhythm, no murmur, click or rub, no peripheral edema  MS: no gross musculoskeletal defects noted, no edema  No obvious gait abnormalities  No pinpoint bony tenderness of the lumbar spine  Hypertonic quadratus lumborum muscle (left worse than right)  Tenderness along the left quadratus lumborum area  Muscle strength 5/5 in lower extremities bilaterally with sensation equal intact in lower extremities bilaterally  PSYCH: mentation appears normal, affect normal/bright      Signed Electronically by: Alyse Ortega DO

## 2024-08-20 NOTE — LETTER
August 22, 2024      Tammie Odonnell  [address]    [place] [place] [address]        Dear ,    We are writing to inform you of your test results.    Anemia is improved but iron levels are still quite low. Would recommend over the counter iron supplementation every other day     Resulted Orders   Ferritin   Result Value Ref Range    Ferritin 12 6 - 175 ng/mL   CBC with platelets   Result Value Ref Range    WBC Count 6.0 4.0 - 11.0 10e3/uL    RBC Count 4.72 3.80 - 5.20 10e6/uL    Hemoglobin 12.2 11.7 - 15.7 g/dL    Hematocrit 39.4 35.0 - 47.0 %    MCV 84 78 - 100 fL    MCH 25.8 (L) 26.5 - 33.0 pg    MCHC 31.0 (L) 31.5 - 36.5 g/dL    RDW 18.1 (H) 10.0 - 15.0 %    Platelet Count 242 150 - 450 10e3/uL       If you have any questions or concerns, please call the clinic at the number listed above.       Sincerely,      Alyse Ortega, DO

## 2024-08-22 ENCOUNTER — TELEPHONE (OUTPATIENT)
Dept: FAMILY MEDICINE | Facility: CLINIC | Age: 34
End: 2024-08-22
Payer: COMMERCIAL

## 2024-08-22 NOTE — TELEPHONE ENCOUNTER
----- Message from Alyse Ortega sent at 8/21/2024  8:09 AM CDT -----  Call patient x 2 and if no answer, please send letter     Anemia is improved but iron levels are still quite low. Would recommend over the counter iron supplementation every other day

## 2024-08-22 NOTE — TELEPHONE ENCOUNTER
Left message for patient to call back. When  patient calls back please relay results below.    Letter has been sent

## 2024-08-27 DIAGNOSIS — M54.16 LUMBAR BACK PAIN WITH RADICULOPATHY AFFECTING LEFT LOWER EXTREMITY: ICD-10-CM

## 2024-08-28 RX ORDER — NAPROXEN 500 MG/1
500 TABLET ORAL 2 TIMES DAILY WITH MEALS
Qty: 14 TABLET | Refills: 0 | OUTPATIENT
Start: 2024-08-28

## 2024-09-03 DIAGNOSIS — M54.16 LUMBAR BACK PAIN WITH RADICULOPATHY AFFECTING LEFT LOWER EXTREMITY: ICD-10-CM

## 2024-09-04 RX ORDER — NAPROXEN 500 MG/1
500 TABLET ORAL 2 TIMES DAILY WITH MEALS
Qty: 14 TABLET | Refills: 0 | OUTPATIENT
Start: 2024-09-04

## 2024-09-16 DIAGNOSIS — M54.16 LUMBAR BACK PAIN WITH RADICULOPATHY AFFECTING LEFT LOWER EXTREMITY: ICD-10-CM

## 2024-09-16 NOTE — TELEPHONE ENCOUNTER
FAX [facility] Prescription Refill Request    naproxen (NAPROSYN) 500 MG tablet 14 tablet 0 8/20/2024 -- --   Sig - Route: Take 1 tablet (500 mg) by mouth 2 times daily (with meals) - Oral

## 2024-09-17 RX ORDER — NAPROXEN 500 MG/1
500 TABLET ORAL 2 TIMES DAILY WITH MEALS
Qty: 14 TABLET | Refills: 0 | OUTPATIENT
Start: 2024-09-17

## 2024-10-29 ENCOUNTER — PATIENT OUTREACH (OUTPATIENT)
Dept: CARE COORDINATION | Facility: CLINIC | Age: 34
End: 2024-10-29
Payer: COMMERCIAL

## (undated) DEVICE — ADH LIQUID MASTISOL TOPICAL VIAL 2-3ML 0523-48

## (undated) DEVICE — SUTURE VICRYL+ 0 36IN CTX VIOLET VCP978H

## (undated) DEVICE — PACK MINOR SINGLE BASIN SSK3001

## (undated) DEVICE — SUCTION TIP POOLE STERILE 35040

## (undated) DEVICE — SU VICRYL+ 2-0 XLH 27IN VIO VCP581G

## (undated) DEVICE — CUSTOM PACK C-SECTION LHE

## (undated) DEVICE — SUTURE VICRYL+ 3-0 27IN CT-1 UND VCP258H

## (undated) DEVICE — PACK MAJOR BASIN 673

## (undated) DEVICE — DRAPE STERI CESAREAN W/POUCH 7966

## (undated) DEVICE — DRSG STERI STRIP 1/2X4" R1547

## (undated) DEVICE — ESU GROUND PAD ADULT REM W/15' CORD E7507DB

## (undated) DEVICE — GLOVE BIOGEL PI ULTRATOUCH G SZ 7.5 42175

## (undated) DEVICE — GLOVE BIOGEL PI INDICATOR 8.0 LF 41680

## (undated) DEVICE — UNDERPAD 30X30 BULK 949B10

## (undated) DEVICE — PREP CHLORAPREP 26ML TINTED HI-LITE ORANGE 930815

## (undated) DEVICE — SUTURE MONOCRYL+ 0 CT-1 UND 18 MCP946H

## (undated) DEVICE — SU VICRYL 3-0 CT 36" J356H

## (undated) DEVICE — GLOVE UNDER INDICATOR PI SZ 7.0 LF 41670

## (undated) DEVICE — DRSG PRIMAPORE 04X11 3/4"

## (undated) DEVICE — PLATE GROUNDING ADULT W/CORD 9165L

## (undated) DEVICE — PREP DURAPREP 26ML APL 8630

## (undated) DEVICE — SU PDS II 0 CTX 60" Z990G

## (undated) DEVICE — SOL NACL 0.9% IRRIG 1000ML BOTTLE 2F7124

## (undated) DEVICE — CATH SUCTION 10FR W/TRAP DYND44110

## (undated) DEVICE — SUCTION MANIFOLD NEPTUNE 2 SYS 1 PORT 702-025-000

## (undated) DEVICE — STPL SKIN SUBCUTICULAR INSORB  2030

## (undated) DEVICE — SUCTION TIP YANKAUER W/O VENT K86

## (undated) DEVICE — SOL WATER IRRIG 1000ML BOTTLE 2F7114

## (undated) DEVICE — SURGICEL POWDER ABSORBABLE HEMOSTAT 3GM 3013SP

## (undated) DEVICE — GLOVE PI ULTRATCH M LF SZ 6.5 PF CUFF TEXT STRL LF 42665

## (undated) DEVICE — SU VICRYL 3-0 FS-1 27" J442H

## (undated) DEVICE — DRSG PRIMAPORE 03 1/8X6" 66000318

## (undated) DEVICE — PAD BLADDER CNTRL SM BL 4IN X 9.75IN  1100B

## (undated) DEVICE — BAG BIOHAZARD RED SMALL 24X23" A4823PR

## (undated) DEVICE — SUTURE VICRYL+ 0 36IN CT-1 UND VCP946H

## (undated) RX ORDER — ONDANSETRON 2 MG/ML
INJECTION INTRAMUSCULAR; INTRAVENOUS
Status: DISPENSED
Start: 2024-05-31

## (undated) RX ORDER — FENTANYL CITRATE 50 UG/ML
INJECTION, SOLUTION INTRAMUSCULAR; INTRAVENOUS
Status: DISPENSED
Start: 2024-05-31

## (undated) RX ORDER — MORPHINE SULFATE 1 MG/ML
INJECTION, SOLUTION EPIDURAL; INTRATHECAL; INTRAVENOUS
Status: DISPENSED
Start: 2024-05-31